# Patient Record
Sex: MALE | Race: WHITE | NOT HISPANIC OR LATINO
[De-identification: names, ages, dates, MRNs, and addresses within clinical notes are randomized per-mention and may not be internally consistent; named-entity substitution may affect disease eponyms.]

---

## 2018-10-29 PROBLEM — Z00.00 ENCOUNTER FOR PREVENTIVE HEALTH EXAMINATION: Status: ACTIVE | Noted: 2018-10-29

## 2018-12-27 ENCOUNTER — APPOINTMENT (OUTPATIENT)
Dept: UROLOGY | Facility: CLINIC | Age: 78
End: 2018-12-27

## 2019-08-08 PROCEDURE — 77263 THER RADIOLOGY TX PLNG CPLX: CPT

## 2019-08-08 PROCEDURE — 99203 OFFICE O/P NEW LOW 30 MIN: CPT | Mod: 25

## 2019-08-13 PROCEDURE — 77334 RADIATION TREATMENT AID(S): CPT | Mod: 26

## 2019-08-13 PROCEDURE — 77290 THER RAD SIMULAJ FIELD CPLX: CPT | Mod: 26

## 2019-08-14 PROCEDURE — 77334 RADIATION TREATMENT AID(S): CPT | Mod: 26

## 2019-08-14 PROCEDURE — 77321 SPECIAL TELETX PORT PLAN: CPT | Mod: 26

## 2019-08-22 ENCOUNTER — OUTPATIENT (OUTPATIENT)
Dept: OUTPATIENT SERVICES | Facility: HOSPITAL | Age: 79
LOS: 1 days | Discharge: HOME | End: 2019-08-22

## 2019-08-22 ENCOUNTER — LABORATORY RESULT (OUTPATIENT)
Age: 79
End: 2019-08-22

## 2019-08-22 PROCEDURE — 77427 RADIATION TX MANAGEMENT X5: CPT

## 2019-08-27 ENCOUNTER — OTHER (OUTPATIENT)
Age: 79
End: 2019-08-27

## 2019-08-27 VITALS
TEMPERATURE: 97.8 F | SYSTOLIC BLOOD PRESSURE: 142 MMHG | WEIGHT: 201.5 LBS | HEART RATE: 64 BPM | DIASTOLIC BLOOD PRESSURE: 73 MMHG | RESPIRATION RATE: 18 BRPM

## 2019-08-27 RX ORDER — SERTRALINE HYDROCHLORIDE 50 MG/1
50 TABLET, FILM COATED ORAL
Refills: 0 | Status: ACTIVE | COMMUNITY
Start: 2019-08-27

## 2019-08-27 RX ORDER — PRAVASTATIN SODIUM 40 MG/1
40 TABLET ORAL
Refills: 0 | Status: ACTIVE | COMMUNITY
Start: 2019-08-27

## 2019-08-27 RX ORDER — CHOLECALCIFEROL (VITAMIN D3) 50 MCG
25 MCG TABLET ORAL
Refills: 0 | Status: ACTIVE | COMMUNITY
Start: 2019-08-27

## 2019-08-27 RX ORDER — WARFARIN SODIUM 2.5 MG/1
2.5 TABLET ORAL
Refills: 0 | Status: ACTIVE | COMMUNITY
Start: 2019-08-27

## 2019-08-27 RX ORDER — AMLODIPINE BESYLATE 10 MG/1
10 TABLET ORAL
Refills: 0 | Status: ACTIVE | COMMUNITY
Start: 2019-08-27

## 2019-08-28 NOTE — DISEASE MANAGEMENT
[Clinical] : TNM Stage: c [TTNM] : . [MTNM] : . [NTNM] : . [II] : II [de-identified] : 1200cGy [de-identified] : 2040cGy [de-identified] : scalp

## 2019-08-28 NOTE — PHYSICAL EXAM
[de-identified] : ECOG 1 [de-identified] : At this point there are no changes on the scalp, the incisions well-healed, and there are no suspicious findings.

## 2019-08-29 PROCEDURE — 77427 RADIATION TX MANAGEMENT X5: CPT

## 2019-08-30 ENCOUNTER — OTHER (OUTPATIENT)
Age: 79
End: 2019-08-30

## 2019-09-03 ENCOUNTER — OTHER (OUTPATIENT)
Age: 79
End: 2019-09-03

## 2019-09-03 VITALS
TEMPERATURE: 96.5 F | HEART RATE: 70 BPM | DIASTOLIC BLOOD PRESSURE: 75 MMHG | WEIGHT: 201.6 LBS | SYSTOLIC BLOOD PRESSURE: 171 MMHG | RESPIRATION RATE: 16 BRPM

## 2019-09-03 NOTE — DISEASE MANAGEMENT
[NTNM] : . [TTNM] : . [MTNM] : . [de-identified] : 0573cGy [de-identified] : 8060cGy [de-identified] : scalp

## 2019-09-03 NOTE — HISTORY OF PRESENT ILLNESS
[FreeTextEntry1] : 9/4/19 Nursing: No skin reaction noted. Applying Eucerin after treatment. Reviewed skin care. Denies pain or discomfort.  almost at the custodial point, no issues.

## 2019-09-03 NOTE — VITALS
[Least Pain Intensity: 0/10] : 0/10 [Maximal Pain Intensity: 0/10] : 0/10 [NoTreatment Scheduled] : no treatment scheduled

## 2019-09-09 PROCEDURE — 77427 RADIATION TX MANAGEMENT X5: CPT

## 2019-09-11 ENCOUNTER — OTHER (OUTPATIENT)
Age: 79
End: 2019-09-11

## 2019-09-11 VITALS
DIASTOLIC BLOOD PRESSURE: 74 MMHG | SYSTOLIC BLOOD PRESSURE: 157 MMHG | TEMPERATURE: 97.1 F | HEART RATE: 61 BPM | BODY MASS INDEX: 28.63 KG/M2 | HEIGHT: 70 IN | RESPIRATION RATE: 16 BRPM | WEIGHT: 200 LBS

## 2019-09-12 NOTE — DISEASE MANAGEMENT
[Clinical] : TNM Stage: c [II] : II [TTNM] : . [NTNM] : . [MTNM] : . [de-identified] : 3750cGy [de-identified] : 3900c?Gy [de-identified] : Scalp

## 2019-09-12 NOTE — HISTORY OF PRESENT ILLNESS
[FreeTextEntry1] : 9/11/19 Nursing:Mild erythema scalp, dry and intact. Skin care reviewed, patient moisturizing daily with Eucerin cream. \par \par 9/4/19 Nursing: No skin reaction noted. Applying Eucerin after treatment. Reviewed skin care. Denies pain or discomfort.  almost at the alf point, no issues.

## 2019-09-12 NOTE — PHYSICAL EXAM
[de-identified] : ECOG 1 [de-identified] : There is a modest, confluent erythema in the field.

## 2019-09-13 ENCOUNTER — OUTPATIENT (OUTPATIENT)
Dept: OUTPATIENT SERVICES | Facility: HOSPITAL | Age: 79
LOS: 1 days | Discharge: HOME | End: 2019-09-13
Payer: MEDICARE

## 2019-09-13 DIAGNOSIS — C44.42 SQUAMOUS CELL CARCINOMA OF SKIN OF SCALP AND NECK: ICD-10-CM

## 2019-09-25 ENCOUNTER — EMERGENCY (EMERGENCY)
Facility: HOSPITAL | Age: 79
LOS: 0 days | Discharge: HOME | End: 2019-09-26
Attending: EMERGENCY MEDICINE | Admitting: EMERGENCY MEDICINE
Payer: MEDICARE

## 2019-09-25 VITALS
TEMPERATURE: 98 F | RESPIRATION RATE: 18 BRPM | OXYGEN SATURATION: 95 % | HEART RATE: 79 BPM | SYSTOLIC BLOOD PRESSURE: 161 MMHG | DIASTOLIC BLOOD PRESSURE: 75 MMHG

## 2019-09-25 DIAGNOSIS — L85.9 EPIDERMAL THICKENING, UNSPECIFIED: ICD-10-CM

## 2019-09-25 DIAGNOSIS — M79.18 MYALGIA, OTHER SITE: ICD-10-CM

## 2019-09-25 DIAGNOSIS — L89.319 PRESSURE ULCER OF RIGHT BUTTOCK, UNSPECIFIED STAGE: ICD-10-CM

## 2019-09-25 DIAGNOSIS — Z79.01 LONG TERM (CURRENT) USE OF ANTICOAGULANTS: ICD-10-CM

## 2019-09-25 DIAGNOSIS — L89.329 PRESSURE ULCER OF LEFT BUTTOCK, UNSPECIFIED STAGE: ICD-10-CM

## 2019-09-25 PROCEDURE — 99284 EMERGENCY DEPT VISIT MOD MDM: CPT

## 2019-09-26 VITALS
DIASTOLIC BLOOD PRESSURE: 75 MMHG | SYSTOLIC BLOOD PRESSURE: 148 MMHG | HEART RATE: 76 BPM | TEMPERATURE: 96 F | OXYGEN SATURATION: 98 % | RESPIRATION RATE: 18 BRPM

## 2019-09-26 LAB
ALBUMIN SERPL ELPH-MCNC: 4.4 G/DL — SIGNIFICANT CHANGE UP (ref 3.5–5.2)
ALP SERPL-CCNC: 102 U/L — SIGNIFICANT CHANGE UP (ref 30–115)
ALT FLD-CCNC: 12 U/L — SIGNIFICANT CHANGE UP (ref 0–41)
ANION GAP SERPL CALC-SCNC: 14 MMOL/L — SIGNIFICANT CHANGE UP (ref 7–14)
APPEARANCE UR: CLEAR — SIGNIFICANT CHANGE UP
APTT BLD: 42.7 SEC — HIGH (ref 27–39.2)
AST SERPL-CCNC: 17 U/L — SIGNIFICANT CHANGE UP (ref 0–41)
BACTERIA # UR AUTO: NEGATIVE — SIGNIFICANT CHANGE UP
BASOPHILS # BLD AUTO: 0.06 K/UL — SIGNIFICANT CHANGE UP (ref 0–0.2)
BASOPHILS NFR BLD AUTO: 0.9 % — SIGNIFICANT CHANGE UP (ref 0–1)
BILIRUB SERPL-MCNC: 0.4 MG/DL — SIGNIFICANT CHANGE UP (ref 0.2–1.2)
BILIRUB UR-MCNC: NEGATIVE — SIGNIFICANT CHANGE UP
BUN SERPL-MCNC: 27 MG/DL — HIGH (ref 10–20)
CALCIUM SERPL-MCNC: 8.7 MG/DL — SIGNIFICANT CHANGE UP (ref 8.5–10.1)
CHLORIDE SERPL-SCNC: 101 MMOL/L — SIGNIFICANT CHANGE UP (ref 98–110)
CO2 SERPL-SCNC: 20 MMOL/L — SIGNIFICANT CHANGE UP (ref 17–32)
COLOR SPEC: COLORLESS — SIGNIFICANT CHANGE UP
CREAT SERPL-MCNC: 2.8 MG/DL — HIGH (ref 0.7–1.5)
DIFF PNL FLD: SIGNIFICANT CHANGE UP
EOSINOPHIL # BLD AUTO: 0.64 K/UL — SIGNIFICANT CHANGE UP (ref 0–0.7)
EOSINOPHIL NFR BLD AUTO: 9.6 % — HIGH (ref 0–8)
EPI CELLS # UR: 0 /HPF — SIGNIFICANT CHANGE UP (ref 0–5)
GLUCOSE SERPL-MCNC: 189 MG/DL — HIGH (ref 70–99)
GLUCOSE UR QL: NEGATIVE — SIGNIFICANT CHANGE UP
HCT VFR BLD CALC: 32.7 % — LOW (ref 42–52)
HGB BLD-MCNC: 10.9 G/DL — LOW (ref 14–18)
HYALINE CASTS # UR AUTO: 1 /LPF — SIGNIFICANT CHANGE UP (ref 0–7)
IMM GRANULOCYTES NFR BLD AUTO: 0.5 % — HIGH (ref 0.1–0.3)
INR BLD: 2.78 RATIO — HIGH (ref 0.65–1.3)
KETONES UR-MCNC: NEGATIVE — SIGNIFICANT CHANGE UP
LACTATE SERPL-SCNC: 2 MMOL/L — SIGNIFICANT CHANGE UP (ref 0.5–2.2)
LEUKOCYTE ESTERASE UR-ACNC: NEGATIVE — SIGNIFICANT CHANGE UP
LYMPHOCYTES # BLD AUTO: 0.81 K/UL — LOW (ref 1.2–3.4)
LYMPHOCYTES # BLD AUTO: 12.2 % — LOW (ref 20.5–51.1)
MCHC RBC-ENTMCNC: 29.1 PG — SIGNIFICANT CHANGE UP (ref 27–31)
MCHC RBC-ENTMCNC: 33.3 G/DL — SIGNIFICANT CHANGE UP (ref 32–37)
MCV RBC AUTO: 87.4 FL — SIGNIFICANT CHANGE UP (ref 80–94)
MONOCYTES # BLD AUTO: 0.55 K/UL — SIGNIFICANT CHANGE UP (ref 0.1–0.6)
MONOCYTES NFR BLD AUTO: 8.3 % — SIGNIFICANT CHANGE UP (ref 1.7–9.3)
NEUTROPHILS # BLD AUTO: 4.57 K/UL — SIGNIFICANT CHANGE UP (ref 1.4–6.5)
NEUTROPHILS NFR BLD AUTO: 68.5 % — SIGNIFICANT CHANGE UP (ref 42.2–75.2)
NITRITE UR-MCNC: NEGATIVE — SIGNIFICANT CHANGE UP
NRBC # BLD: 0 /100 WBCS — SIGNIFICANT CHANGE UP (ref 0–0)
PH UR: 6 — SIGNIFICANT CHANGE UP (ref 5–8)
PLATELET # BLD AUTO: 236 K/UL — SIGNIFICANT CHANGE UP (ref 130–400)
POTASSIUM SERPL-MCNC: 4.1 MMOL/L — SIGNIFICANT CHANGE UP (ref 3.5–5)
POTASSIUM SERPL-SCNC: 4.1 MMOL/L — SIGNIFICANT CHANGE UP (ref 3.5–5)
PROT SERPL-MCNC: 7.7 G/DL — SIGNIFICANT CHANGE UP (ref 6–8)
PROT UR-MCNC: ABNORMAL
PROTHROM AB SERPL-ACNC: 31.7 SEC — HIGH (ref 9.95–12.87)
RBC # BLD: 3.74 M/UL — LOW (ref 4.7–6.1)
RBC # FLD: 13.2 % — SIGNIFICANT CHANGE UP (ref 11.5–14.5)
RBC CASTS # UR COMP ASSIST: 1 /HPF — SIGNIFICANT CHANGE UP (ref 0–4)
SODIUM SERPL-SCNC: 135 MMOL/L — SIGNIFICANT CHANGE UP (ref 135–146)
SP GR SPEC: 1.01 — LOW (ref 1.01–1.02)
UROBILINOGEN FLD QL: SIGNIFICANT CHANGE UP
WBC # BLD: 6.66 K/UL — SIGNIFICANT CHANGE UP (ref 4.8–10.8)
WBC # FLD AUTO: 6.66 K/UL — SIGNIFICANT CHANGE UP (ref 4.8–10.8)
WBC UR QL: 0 /HPF — SIGNIFICANT CHANGE UP (ref 0–5)

## 2019-09-26 NOTE — ED PROVIDER NOTE - OBJECTIVE STATEMENT
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 Pt states that he sleeps on a recliner and also sometimes has bowel incontinence in his diaper. has intermittent dependent ulcers that normally heals but this incident is not healing and associated with bleeding. Has minimal pain, no fever, no abd pain. No trauma. No aggravating or alleviating factors.

## 2019-09-26 NOTE — ED PROVIDER NOTE - PHYSICAL EXAMINATION
CONSTITUTIONAL: Well-developed; well-nourished; in no acute distress, nontoxic appearing  SKIN: + skin hyperkeratosis on b/l buttock with associated superficial subcutaneous hematomas. + friable area on the left buttock with blood oozing from that site.  HEAD: Normocephalic; atraumatic.  EYES: PERRL, 3 mm bilateral, no nystagmus, EOM intact; conjunctiva and sclera clear.  ENT: MMM, no nasal congestion  NECK: Supple; non tender.+ full passive ROM in all directions. No JVD  CARD: S1, S2 normal, no murmur  RESP: No wheezes, rales or rhonchi. Good air movement bilaterally  ABD: soft; non-distended; non-tender. No Rebound, No guarding  EXT: Normal ROM. No cyanosis or edema. Dp Pulses intact.   NEURO: awake, alert, following commands, oriented, grossly unremarkable. No Focal deficits. GCS 15. Gait antalgic due to arthritis, chronic.  PSYCH: Cooperative, appropriate.

## 2019-09-26 NOTE — ED PROVIDER NOTE - NS ED ROS FT
Review of Systems    Constitutional: (-) fever  Heent: no complaints  Cardiovascular: (-) chest pain, (-) syncope  Respiratory: (-) cough, (-) shortness of breath  Gastrointestinal: (-) vomiting, (-) abdominal pain, hx of colectomy and chronic diarrhea.  Musculoskeletal: (-) neck pain, (-) back pain, (-) joint pain  Integumentary: As per HPI  Neurological: (-) headache, (-) altered mental status    Except as documented in the HPI, all other systems are negative.

## 2019-09-26 NOTE — ED ADULT NURSE NOTE - NSIMPLEMENTINTERV_GEN_ALL_ED
Implemented All Universal Safety Interventions:  [place] to call system. Call bell, personal items and telephone within reach. Instruct patient to call for assistance. Room bathroom lighting operational. Non-slip footwear when patient is off stretcher. Physically safe environment: no spills, clutter or unnecessary equipment. Stretcher in lowest position, wheels locked, appropriate side rails in place.

## 2019-09-26 NOTE — ED PROVIDER NOTE - CLINICAL SUMMARY MEDICAL DECISION MAKING FREE TEXT BOX
Pt with chronic buttock wound, required labs and burn consult. No acute intervention, pt to f/up outpatient. Discussed wound dressing with patient and granddaughter. Will d/c.    ED evaluation and management discussed with the patient and family (if available) in detail.  Close PMD follow up encouraged.  Strict ED return instructions discussed in detail and patient given the opportunity to ask any questions about their discharge diagnosis and instructions. Patient verbalized understanding.

## 2019-09-26 NOTE — ED ADULT NURSE NOTE - OBJECTIVE STATEMENT
pt presents to ed with c/o bleeding from sacral wound. pt presents to ed with c/o bleeding from sacral wound. pt states he has had similar wounds in the past from sleeping on his recliner chair but non have bled like this before. pt pmd directed him to come to ed for treatment. pt reports that he has chronic diarrhea and goes to the bathroom multiple times a day. pt is able to ambulate and use the bathroom independently but wears diapers for emergencies. pt denies feeling lightheaded or dizzy, SOB, chest pain, n/v.  pt presents with bilateral buttocks with macerated tissue along with scarring. Two hemorrhagic blisters, one on each buttock. Active Oozing blood near left gluteal crease.

## 2019-09-26 NOTE — CONSULT NOTE ADULT - ASSESSMENT
79y male with bilateral buttock wounds - likely 2ndary to prolonged sitting, pressure, elevated INR and chronic diarrhea macerating the skin        Recommendations  C/w with transexamic acid until bleeding stops in ED  LWC BID xeroform/gauze , change if soiled   F/u with colorectal surgeon for possible biopsy of wounds to r/o malignant cause of wounds  Burn f/u within 1 to 2 weeks if no improvement  F/u with primary care provider for coumadin therapy and pt/INR management   discussed with attending and ED provider   No surgery for debridement indicated at this time

## 2019-09-26 NOTE — CONSULT NOTE ADULT - SUBJECTIVE AND OBJECTIVE BOX
79y male with pmh of dvt on coumadin, and colorectal cancer s/p total colectomy ~20 years ago presents to ED with complaint of bleeding from buttock wounds for which Burn Service consulted for. Patient has had similar wounds in the past which have healed without issue but never experienced bleeding like this before. Patient's primary care doctor is the one who told him to come to ED to be evaluated Patient suffers from chronic diarrhea due to his colon cancer history and has diarrhea multiple times a day for the past 20 years. Patient is ambulatory and uses the bathroom independently but wears a diaper in case of emergencies. Patient reports he sleeps on his recliner chair every night and also spends ~60% of his awake time on the recliner as well. Patient denies feeling lightheaded or dizzy, SOB or chest pain.    Physical exam    Bilateral buttocks with macerated tissue along with scarring. Two hemorrhagic blisters, one on each buttock. Active Oozing blood near left gluteal crease.     Transexamic acid soaked gauze applied to left buttock at site of bleeding. Xeroform applied to right buttock.                               10.9   6.66  )-----------( 236      ( 26 Sep 2019 00:45 )             32.7   PT/INR - ( 26 Sep 2019 01:00 )   PT: 31.70 sec;   INR: 2.78 ratio         PTT - ( 26 Sep 2019 01:00 )  PTT:42.7 sec

## 2019-09-27 LAB
CULTURE RESULTS: SIGNIFICANT CHANGE UP
SPECIMEN SOURCE: SIGNIFICANT CHANGE UP

## 2019-10-03 ENCOUNTER — OUTPATIENT (OUTPATIENT)
Dept: OUTPATIENT SERVICES | Facility: HOSPITAL | Age: 79
LOS: 1 days | Discharge: HOME | End: 2019-10-03

## 2019-10-03 ENCOUNTER — APPOINTMENT (OUTPATIENT)
Dept: BURN CARE | Facility: CLINIC | Age: 79
End: 2019-10-03
Payer: COMMERCIAL

## 2019-10-03 ENCOUNTER — APPOINTMENT (OUTPATIENT)
Dept: RADIATION ONCOLOGY | Facility: CLINIC | Age: 79
End: 2019-10-03
Payer: MEDICARE

## 2019-10-03 VITALS
HEART RATE: 73 BPM | TEMPERATURE: 96.2 F | RESPIRATION RATE: 16 BRPM | DIASTOLIC BLOOD PRESSURE: 60 MMHG | BODY MASS INDEX: 28.84 KG/M2 | WEIGHT: 201 LBS | SYSTOLIC BLOOD PRESSURE: 133 MMHG

## 2019-10-03 DIAGNOSIS — Z85.828 PERSONAL HISTORY OF OTHER MALIGNANT NEOPLASM OF SKIN: ICD-10-CM

## 2019-10-03 DIAGNOSIS — C44.42 SQUAMOUS CELL CARCINOMA OF SKIN OF SCALP AND NECK: ICD-10-CM

## 2019-10-03 DIAGNOSIS — Z85.038 PERSONAL HISTORY OF OTHER MALIGNANT NEOPLASM OF LARGE INTESTINE: ICD-10-CM

## 2019-10-03 DIAGNOSIS — Z86.79 PERSONAL HISTORY OF OTHER DISEASES OF THE CIRCULATORY SYSTEM: ICD-10-CM

## 2019-10-03 PROCEDURE — 99203 OFFICE O/P NEW LOW 30 MIN: CPT

## 2019-10-03 PROCEDURE — 99024 POSTOP FOLLOW-UP VISIT: CPT

## 2019-10-03 NOTE — HISTORY OF PRESENT ILLNESS
[Did you have an operation on your burn/wound injury?] : Did you have an operation on your burn/wound injury? No [Did this injury occur on the job?] : Did this injury occur on the job? No [de-identified] : 6 months ago [de-identified] : home [de-identified] : Pt states that he sleeps in his chair for long periods of time causing a pressure ulcer on his buttock. He is currently using dry gauze.

## 2019-10-03 NOTE — DISEASE MANAGEMENT
[Clinical] : TNM Stage: c [II] : II [NTNM] : . [TTNM] : . [de-identified] : 5150cGy [MTNM] : . [de-identified] : 0200cGy [de-identified] : Scalp  Interval since therapy:  One month

## 2019-10-03 NOTE — REASON FOR VISIT
[Initial] : initial visit [Were you seen in the Emergency Room?] : seen in the emergency room [Were you admitted to the burn center at [facility]?] : not admitted to the burn center at [facility]

## 2019-10-03 NOTE — PHYSICAL EXAM
[Normal] : well developed, well nourished, in no acute distress [de-identified] : ECOG 1 (cane) [de-identified] : The skin of the scalp is now normal without erythema or desquamation.  No suspicious findings.

## 2019-10-03 NOTE — HISTORY OF PRESENT ILLNESS
[FreeTextEntry1] : 10/3/19 Pt returns for follow up after completion of RT to scalp. Denies any issues since completion of RT. No skin reaction or erythema noted. Small dry patch noted. Applying Eucerin twice daily. No complaints at this time. Followed up with Dr. Casey today for bedsores.

## 2019-10-17 ENCOUNTER — APPOINTMENT (OUTPATIENT)
Dept: BURN CARE | Facility: CLINIC | Age: 79
End: 2019-10-17
Payer: COMMERCIAL

## 2019-10-17 ENCOUNTER — OUTPATIENT (OUTPATIENT)
Dept: OUTPATIENT SERVICES | Facility: HOSPITAL | Age: 79
LOS: 1 days | Discharge: HOME | End: 2019-10-17

## 2019-10-17 PROCEDURE — 16020 DRESS/DEBRID P-THICK BURN S: CPT

## 2019-10-17 PROCEDURE — 99213 OFFICE O/P EST LOW 20 MIN: CPT | Mod: 25

## 2019-10-22 NOTE — ASSESSMENT
[Wound Care] : wound care [FreeTextEntry1] : bilateral buttocks healing--> debrided --> local wound care

## 2019-10-22 NOTE — PHYSICAL EXAM
[Healing] : healing [Infected?] : Infected: No [Size%: ______] : Size: [unfilled]% [Abnormal] : abnormal [3] : 3 out of 10 [Large] : medium [] : no [de-identified] : bilateral buttocks healing--> debrided --> local wound care [TWNoteComboBox1] : SALEEM

## 2019-10-22 NOTE — HISTORY OF PRESENT ILLNESS
[Did you have an operation on your burn/wound injury?] : Did you have an operation on your burn/wound injury? No [Did this injury occur on the job?] : Did this injury occur on the job? No [de-identified] : 6 months ago [de-identified] : home [de-identified] : bilateral buttock pressure sores [de-identified] : healing

## 2019-10-22 NOTE — REASON FOR VISIT
[Were you seen in the Emergency Room?] : seen in the emergency room [Revisit] : revisit [Were you admitted to the burn center at [facility]?] : not admitted to the burn center at [facility] [FreeTextEntry2] : evaluation buttock pressure sore

## 2019-11-14 ENCOUNTER — OUTPATIENT (OUTPATIENT)
Dept: OUTPATIENT SERVICES | Facility: HOSPITAL | Age: 79
LOS: 1 days | Discharge: HOME | End: 2019-11-14

## 2019-11-14 ENCOUNTER — APPOINTMENT (OUTPATIENT)
Dept: BURN CARE | Facility: CLINIC | Age: 79
End: 2019-11-14
Payer: COMMERCIAL

## 2019-11-14 DIAGNOSIS — L89.309 PRESSURE ULCER OF UNSPECIFIED BUTTOCK, UNSPECIFIED STAGE: ICD-10-CM

## 2019-11-14 PROCEDURE — 99213 OFFICE O/P EST LOW 20 MIN: CPT | Mod: 25

## 2019-11-14 PROCEDURE — 16025 DRESS/DEBRID P-THICK BURN M: CPT

## 2019-11-14 NOTE — REASON FOR VISIT
[Revisit] : revisit [Were you seen in the Emergency Room?] : seen in the emergency room [FreeTextEntry2] : evaluation buttock pressure sore [Were you admitted to the burn center at [facility]?] : not admitted to the burn center at [facility]

## 2019-11-14 NOTE — PHYSICAL EXAM
[Healing] : healing [Size%: ______] : Size: [unfilled]% [3] : 3 out of 10 [Abnormal] : abnormal [Large] : medium [Infected?] : Infected: No [de-identified] : bilateral buttocks healing--> debrided --> local wound care [] : no [TWNoteComboBox1] : xeroform [TWNoteComboBox2] : Bacitracin

## 2019-11-14 NOTE — PHYSICAL EXAM
[Healing] : healing [Size%: ______] : Size: [unfilled]% [3] : 3 out of 10 [Abnormal] : abnormal [Large] : medium [Infected?] : Infected: No [de-identified] : bilateral buttocks healing--> debrided --> local wound care [] : no [TWNoteComboBox1] : xeroform [TWNoteComboBox2] : Bacitracin

## 2019-11-14 NOTE — REASON FOR VISIT
[Revisit] : revisit [Were you seen in the Emergency Room?] : seen in the emergency room [Were you admitted to the burn center at [facility]?] : not admitted to the burn center at [facility] [FreeTextEntry2] : evaluation buttock pressure sore

## 2019-11-14 NOTE — HISTORY OF PRESENT ILLNESS
[Did you have an operation on your burn/wound injury?] : Did you have an operation on your burn/wound injury? No [Did this injury occur on the job?] : Did this injury occur on the job? No [de-identified] : home [de-identified] : 6 months ago [de-identified] : healing [de-identified] : bilateral buttock pressure sores

## 2019-11-14 NOTE — HISTORY OF PRESENT ILLNESS
[Did you have an operation on your burn/wound injury?] : Did you have an operation on your burn/wound injury? No [Did this injury occur on the job?] : Did this injury occur on the job? No [de-identified] : 6 months ago [de-identified] : home [de-identified] : bilateral buttock pressure sores [de-identified] : healing
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
## 2019-11-19 DIAGNOSIS — L89.309 PRESSURE ULCER OF UNSPECIFIED BUTTOCK, UNSPECIFIED STAGE: ICD-10-CM

## 2019-12-05 ENCOUNTER — OUTPATIENT (OUTPATIENT)
Dept: OUTPATIENT SERVICES | Facility: HOSPITAL | Age: 79
LOS: 1 days | Discharge: HOME | End: 2019-12-05

## 2019-12-05 ENCOUNTER — APPOINTMENT (OUTPATIENT)
Dept: BURN CARE | Facility: CLINIC | Age: 79
End: 2019-12-05
Payer: COMMERCIAL

## 2019-12-05 PROCEDURE — 16025 DRESS/DEBRID P-THICK BURN M: CPT

## 2019-12-05 PROCEDURE — 99213 OFFICE O/P EST LOW 20 MIN: CPT | Mod: 25

## 2019-12-16 DIAGNOSIS — L89.309 PRESSURE ULCER OF UNSPECIFIED BUTTOCK, UNSPECIFIED STAGE: ICD-10-CM
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
## 2020-01-16 ENCOUNTER — APPOINTMENT (OUTPATIENT)
Dept: BURN CARE | Facility: CLINIC | Age: 80
End: 2020-01-16

## 2020-05-22 ENCOUNTER — EMERGENCY (EMERGENCY)
Facility: HOSPITAL | Age: 80
LOS: 0 days | Discharge: HOME | End: 2020-05-22
Attending: STUDENT IN AN ORGANIZED HEALTH CARE EDUCATION/TRAINING PROGRAM | Admitting: STUDENT IN AN ORGANIZED HEALTH CARE EDUCATION/TRAINING PROGRAM
Payer: MEDICARE

## 2020-05-22 VITALS
OXYGEN SATURATION: 97 % | DIASTOLIC BLOOD PRESSURE: 61 MMHG | HEART RATE: 82 BPM | TEMPERATURE: 100 F | RESPIRATION RATE: 18 BRPM | SYSTOLIC BLOOD PRESSURE: 114 MMHG

## 2020-05-22 VITALS
RESPIRATION RATE: 16 BRPM | OXYGEN SATURATION: 98 % | HEART RATE: 85 BPM | SYSTOLIC BLOOD PRESSURE: 120 MMHG | TEMPERATURE: 98 F | DIASTOLIC BLOOD PRESSURE: 65 MMHG

## 2020-05-22 DIAGNOSIS — Z00.00 ENCOUNTER FOR GENERAL ADULT MEDICAL EXAMINATION WITHOUT ABNORMAL FINDINGS: ICD-10-CM

## 2020-05-22 DIAGNOSIS — N17.9 ACUTE KIDNEY FAILURE, UNSPECIFIED: ICD-10-CM

## 2020-05-22 DIAGNOSIS — L98.419 NON-PRESSURE CHRONIC ULCER OF BUTTOCK WITH UNSPECIFIED SEVERITY: ICD-10-CM

## 2020-05-22 LAB
ALBUMIN SERPL ELPH-MCNC: 3.3 G/DL — LOW (ref 3.5–5.2)
ALP SERPL-CCNC: 69 U/L — SIGNIFICANT CHANGE UP (ref 30–115)
ALT FLD-CCNC: 10 U/L — SIGNIFICANT CHANGE UP (ref 0–41)
ANION GAP SERPL CALC-SCNC: 16 MMOL/L — HIGH (ref 7–14)
APPEARANCE UR: CLEAR — SIGNIFICANT CHANGE UP
APTT BLD: 35.9 SEC — SIGNIFICANT CHANGE UP (ref 27–39.2)
AST SERPL-CCNC: 13 U/L — SIGNIFICANT CHANGE UP (ref 0–41)
BACTERIA # UR AUTO: NEGATIVE — SIGNIFICANT CHANGE UP
BASOPHILS # BLD AUTO: 0.02 K/UL — SIGNIFICANT CHANGE UP (ref 0–0.2)
BASOPHILS NFR BLD AUTO: 0.3 % — SIGNIFICANT CHANGE UP (ref 0–1)
BILIRUB SERPL-MCNC: 0.4 MG/DL — SIGNIFICANT CHANGE UP (ref 0.2–1.2)
BILIRUB UR-MCNC: NEGATIVE — SIGNIFICANT CHANGE UP
BLD GP AB SCN SERPL QL: SIGNIFICANT CHANGE UP
BUN SERPL-MCNC: 54 MG/DL — HIGH (ref 10–20)
CALCIUM SERPL-MCNC: 8.7 MG/DL — SIGNIFICANT CHANGE UP (ref 8.5–10.1)
CHLORIDE SERPL-SCNC: 92 MMOL/L — LOW (ref 98–110)
CO2 SERPL-SCNC: 21 MMOL/L — SIGNIFICANT CHANGE UP (ref 17–32)
COLOR SPEC: YELLOW — SIGNIFICANT CHANGE UP
CREAT SERPL-MCNC: 3.6 MG/DL — HIGH (ref 0.7–1.5)
DIFF PNL FLD: ABNORMAL
EOSINOPHIL # BLD AUTO: 0.01 K/UL — SIGNIFICANT CHANGE UP (ref 0–0.7)
EOSINOPHIL NFR BLD AUTO: 0.1 % — SIGNIFICANT CHANGE UP (ref 0–8)
EPI CELLS # UR: 2 /HPF — SIGNIFICANT CHANGE UP (ref 0–5)
GLUCOSE SERPL-MCNC: 134 MG/DL — HIGH (ref 70–99)
GLUCOSE UR QL: NEGATIVE — SIGNIFICANT CHANGE UP
HCT VFR BLD CALC: 31.8 % — LOW (ref 42–52)
HGB BLD-MCNC: 10.6 G/DL — LOW (ref 14–18)
HYALINE CASTS # UR AUTO: 7 /LPF — SIGNIFICANT CHANGE UP (ref 0–7)
IMM GRANULOCYTES NFR BLD AUTO: 0.4 % — HIGH (ref 0.1–0.3)
INR BLD: 3.77 RATIO — HIGH (ref 0.65–1.3)
KETONES UR-MCNC: NEGATIVE — SIGNIFICANT CHANGE UP
LEUKOCYTE ESTERASE UR-ACNC: NEGATIVE — SIGNIFICANT CHANGE UP
LIDOCAIN IGE QN: 51 U/L — SIGNIFICANT CHANGE UP (ref 7–60)
LYMPHOCYTES # BLD AUTO: 0.66 K/UL — LOW (ref 1.2–3.4)
LYMPHOCYTES # BLD AUTO: 8.7 % — LOW (ref 20.5–51.1)
MAGNESIUM SERPL-MCNC: 2 MG/DL — SIGNIFICANT CHANGE UP (ref 1.8–2.4)
MCHC RBC-ENTMCNC: 28 PG — SIGNIFICANT CHANGE UP (ref 27–31)
MCHC RBC-ENTMCNC: 33.3 G/DL — SIGNIFICANT CHANGE UP (ref 32–37)
MCV RBC AUTO: 84.1 FL — SIGNIFICANT CHANGE UP (ref 80–94)
MONOCYTES # BLD AUTO: 1.29 K/UL — HIGH (ref 0.1–0.6)
MONOCYTES NFR BLD AUTO: 17 % — HIGH (ref 1.7–9.3)
NEUTROPHILS # BLD AUTO: 5.6 K/UL — SIGNIFICANT CHANGE UP (ref 1.4–6.5)
NEUTROPHILS NFR BLD AUTO: 73.5 % — SIGNIFICANT CHANGE UP (ref 42.2–75.2)
NITRITE UR-MCNC: NEGATIVE — SIGNIFICANT CHANGE UP
NRBC # BLD: 0 /100 WBCS — SIGNIFICANT CHANGE UP (ref 0–0)
PH UR: 6 — SIGNIFICANT CHANGE UP (ref 5–8)
PHOSPHATE SERPL-MCNC: 3.8 MG/DL — SIGNIFICANT CHANGE UP (ref 2.1–4.9)
PLATELET # BLD AUTO: 301 K/UL — SIGNIFICANT CHANGE UP (ref 130–400)
POTASSIUM SERPL-MCNC: 5 MMOL/L — SIGNIFICANT CHANGE UP (ref 3.5–5)
POTASSIUM SERPL-SCNC: 5 MMOL/L — SIGNIFICANT CHANGE UP (ref 3.5–5)
PROT SERPL-MCNC: 6.8 G/DL — SIGNIFICANT CHANGE UP (ref 6–8)
PROT UR-MCNC: ABNORMAL
PROTHROM AB SERPL-ACNC: >40 SEC — HIGH (ref 9.95–12.87)
RBC # BLD: 3.78 M/UL — LOW (ref 4.7–6.1)
RBC # FLD: 14.1 % — SIGNIFICANT CHANGE UP (ref 11.5–14.5)
RBC CASTS # UR COMP ASSIST: 1 /HPF — SIGNIFICANT CHANGE UP (ref 0–4)
SODIUM SERPL-SCNC: 129 MMOL/L — LOW (ref 135–146)
SP GR SPEC: 1.02 — SIGNIFICANT CHANGE UP (ref 1.01–1.02)
UROBILINOGEN FLD QL: SIGNIFICANT CHANGE UP
WBC # BLD: 7.61 K/UL — SIGNIFICANT CHANGE UP (ref 4.8–10.8)
WBC # FLD AUTO: 7.61 K/UL — SIGNIFICANT CHANGE UP (ref 4.8–10.8)
WBC UR QL: 3 /HPF — SIGNIFICANT CHANGE UP (ref 0–5)

## 2020-05-22 PROCEDURE — 74176 CT ABD & PELVIS W/O CONTRAST: CPT | Mod: 26

## 2020-05-22 PROCEDURE — 99285 EMERGENCY DEPT VISIT HI MDM: CPT

## 2020-05-22 PROCEDURE — 71045 X-RAY EXAM CHEST 1 VIEW: CPT | Mod: 26

## 2020-05-22 RX ORDER — PIPERACILLIN AND TAZOBACTAM 4; .5 G/20ML; G/20ML
3.38 INJECTION, POWDER, LYOPHILIZED, FOR SOLUTION INTRAVENOUS ONCE
Refills: 0 | Status: DISCONTINUED | OUTPATIENT
Start: 2020-05-22 | End: 2020-05-22

## 2020-05-22 RX ORDER — CIPROFLOXACIN LACTATE 400MG/40ML
1 VIAL (ML) INTRAVENOUS
Qty: 5 | Refills: 0
Start: 2020-05-22 | End: 2020-05-31

## 2020-05-22 RX ORDER — SODIUM CHLORIDE 9 MG/ML
250 INJECTION INTRAMUSCULAR; INTRAVENOUS; SUBCUTANEOUS ONCE
Refills: 0 | Status: COMPLETED | OUTPATIENT
Start: 2020-05-22 | End: 2020-05-22

## 2020-05-22 RX ADMIN — SODIUM CHLORIDE 500 MILLILITER(S): 9 INJECTION INTRAMUSCULAR; INTRAVENOUS; SUBCUTANEOUS at 18:54

## 2020-05-22 NOTE — ED ADULT NURSE REASSESSMENT NOTE - NS ED NURSE REASSESS COMMENT FT1
Burn team at bedside, dressing applied to the sacrum wound, pt tolerated well. pt aware of the plan of care and has no questions or concerns at this time. Safety maintained, will continue to monitor

## 2020-05-22 NOTE — ED PROVIDER NOTE - NS ED ROS FT
Constitutional: +fever today, no rigors  Eyes: no eye redness, acute visual change  ENMT: no ear pain, no throat pain  Card: no chest pain, no palpitations  Pulm: no cough, no shortness of breath  GI: pos abdominal pain, no nausea or vomiting  : no dysuria or hematuria  MSK: no limitation in range of motion, no neck pain  Skin:+buttock ulcer, no lacerations  Neuro: no numbness, no weakness  Heme/Onc: no easy bruising, no bleeding tendency   Allergic: no hives, no throat swelling

## 2020-05-22 NOTE — ED PROVIDER NOTE - PROGRESS NOTE DETAILS
d/w Dr. Jay Scott- pt w/ known ckd, Cr slightly elevated from baseline. Dr. Scott rec 250cc NS, he can arrange close Nephro f/u. Per Dr. Scott, pt decompensates when hospitalized. Pt has strong outpt services and access to care. Will provide wound care recs to pt and Dr. Scott. Pt has VNS. CT pending. will discuss CT results w/ Dr. Scott as well as dispo abx Serial abdominal exam benign. pt endorsing having normal bowel movement in ED. Pt denies active abdominal pain. Pt given juice and is tolerating PO well. No nausea/vomiting or pain. CT findings d/w Dr. Thompson and Maria Dolores Scott. David Scott are leaning to discharge with close follow up given expected adverse effects associated w/ admission (deconditioning and delirium). David Scott will arrange for close renal follow up. Discussed all findings with patient and daughter Tata - haylie, supratherapeutic INR, possible partial SBO. Given pt's strong outpt follow up and home resources, patient and family comfortable with discharge with strict return precautions. Specially discussed decreased PO intake, c/f dehydration, nausea/vomiting, constipation, abdominal pain, decreased urine output.

## 2020-05-22 NOTE — ED ADULT NURSE NOTE - INTERVENTIONS DEFINITIONS
Reinforce activity limits and safety measures with patient and family/Monitor gait and stability/Instruct patient to call for assistance

## 2020-05-22 NOTE — ED PROVIDER NOTE - CLINICAL SUMMARY MEDICAL DECISION MAKING FREE TEXT BOX
pt presents for c/f nonhealing buttock ulcer. seen by burn and cleared for outpt management. pt has vns established for wound care. incidental findings d/w David Scott, pt and family with plan to address all as outpt and strict return precautions. risk associated with hospitalization outweigh benefit to David Scott and family.

## 2020-05-22 NOTE — ED ADULT NURSE REASSESSMENT NOTE - NS ED NURSE REASSESS COMMENT FT1
pt refused to wait for antibiotics first dose int he ED. His daughter in the waiting room. pt states " I will pick it up from pharmacy on my home". pt was taken to the waiting room by wheelchair. Daughter states" her daughter is bringing the car". Daughter states it is okay to leave him in the wheelchair until family member arrive with the car

## 2020-05-22 NOTE — CONSULT NOTE ADULT - ASSESSMENT
78 y/o Male with pmh of CKD,HTN, HLD, and DVT on Coumadin presented to the ED with complaint of sacral ulcer to the B/L buttock.  - no recommendations for surgery at this time   - local wound care: santyl, Dakins moist Kerlix/gauze cover with ABD pad   - follow up at outpatient burn clinic Tuesday 5/26/20

## 2020-05-22 NOTE — CONSULT NOTE ADULT - SUBJECTIVE AND OBJECTIVE BOX
80 y/o Male with pmh of CKD,HTN, HLD, and DVT on Coumadin presented to the ED with complaint of pressure ulcer to the B/L buttock. Burn team consulted for evaluation of buttock ulcer.  pt has had home wound care for the past 4 months and bed sore is not improving. Pt says he is ambulatory but sleeps in his chair. pt states he feels generally weak but is functioning at baseline. Pt had a temp of 100.3 in the ED but denies feeling fevers, chills, HA,  numbness, SOB, CP, focal weakness, and urinary incontinence.        PHYSICAL EXAM:  Gen: patient A&Ox3, no signs of acute distress   SKIN: No rashes or lesions  Wound:   Left buttock wound: ~[id number] pressure ulcer, pink and moist with some areas of white necrotic tissue around wound borders with surrounding erythema. No mucopurulent drainage or foul odor noted.   Right buttock wound: ~[id number] pressure ulcer, pink and moist with some areas of white necrotic tissue around wound borders with surrounding erythema. No mucopurulent drainage, edema  or foul odor noted.

## 2020-05-22 NOTE — ED PROVIDER NOTE - CARE PLAN
Principal Discharge DX:	Skin ulcer of buttock, unspecified ulcer stage  Secondary Diagnosis:	ALEX (acute kidney injury)

## 2020-05-22 NOTE — ED PROVIDER NOTE - PHYSICAL EXAMINATION
PHYSICAL EXAM:    Constitutional: awake, alert, NAD  Eyes: EOMI, no conj injection  HENT: NC AT  Back: no c/t/l spine ttp  Respiratory: no respiratory distress, breath sounds equal b/l, no wheezing, rhonchi or stridor.   Cardiovascular: RRR nml S1S2  Gastrointestinal: soft, no masses, nontender, nondistended. +mild LLQ discomfort on palpation. No guarding or rebound.   Left buttock wound: ~[id number] pressure ulcer, pink and moist. some areas of white necrotic tissue around wound borders with surrounding erythema. No purulent discharge  Right buttock wound: ~[id number] pressure ulcer, pink and moist with some areas of white necrotic tissue around wound borders with surrounding erythema. No purulent discharge  Extremities: no peripheral edema  Neurological: AAOx3, CN II-XII grossly intact, no focal numbness or weakness  Skin: no rash  Musculoskeletal: no gross deformity

## 2020-05-22 NOTE — ED PROVIDER NOTE - NSFOLLOWUPINSTRUCTIONS_ED_ALL_ED_FT
Take levaquin every 48 hours (5/24, 5/26, 5/28, 5/30). Follow up with Dr. Scott on Tuesday. Call his office to discuss when to see your nephrologist and if you should go to the Burn Clinic Tuesday (5/26) or another day.     Your wound care instructions are:   - local wound care: santyl, Dakins moist Kerlix/gauze cover with ABD pad   - follow up at outpatient burn clinic Tuesday 5/26/20     Return for worsening abdominal pain, nausea/vomiting or pain with eating, inability to have bowel movements, or feeling very bloated. Return for concerns of dehydration.  Return for severe weakness or fall.

## 2020-05-22 NOTE — ED PROVIDER NOTE - PATIENT PORTAL LINK FT
You can access the FollowMyHealth Patient Portal offered by [facility] by registering at the following website: [email or web address]. By joining [employer]’s FollowMyHealth portal, you will also be able to view your health information using other applications (apps) compatible with our system.

## 2020-05-22 NOTE — ED PROVIDER NOTE - OBJECTIVE STATEMENT
78 yo m hx htn, hld, ckd, decub ulcer here for evaluation of decub ulcer. pt has had home wound care for the past 4 months and bed sore is not improving. visiting nurse discussed burn eval w/ pcp Dr. Scott and pt sent in for burn eval. pt febrile today. pt states he feels generally weak but is functioning at baseline. no numbness, focal weakness, urinary incontinence. pt states he has mild intermittent abd pain which has been worked up by Dr. Scott

## 2020-05-22 NOTE — ED PROVIDER NOTE - NSFOLLOWUPCLINICS_GEN_ALL_ED_FT
[facility]  Burn  [address], [address]  [place] [address]  Phone: [phone]  Fax:   Follow Up Time:

## 2020-05-23 LAB
CULTURE RESULTS: NO GROWTH — SIGNIFICANT CHANGE UP
SPECIMEN SOURCE: SIGNIFICANT CHANGE UP

## 2020-05-24 LAB
-  AMPICILLIN/SULBACTAM: SIGNIFICANT CHANGE UP
-  CEFAZOLIN: SIGNIFICANT CHANGE UP
-  CLINDAMYCIN: SIGNIFICANT CHANGE UP
-  ERYTHROMYCIN: SIGNIFICANT CHANGE UP
-  GENTAMICIN: SIGNIFICANT CHANGE UP
-  OXACILLIN: SIGNIFICANT CHANGE UP
-  PENICILLIN: SIGNIFICANT CHANGE UP
-  RIFAMPIN: SIGNIFICANT CHANGE UP
-  TETRACYCLINE: SIGNIFICANT CHANGE UP
-  TRIMETHOPRIM/SULFAMETHOXAZOLE: SIGNIFICANT CHANGE UP
-  VANCOMYCIN: SIGNIFICANT CHANGE UP
CULTURE RESULTS: SIGNIFICANT CHANGE UP
METHOD TYPE: SIGNIFICANT CHANGE UP
ORGANISM # SPEC MICROSCOPIC CNT: SIGNIFICANT CHANGE UP
ORGANISM # SPEC MICROSCOPIC CNT: SIGNIFICANT CHANGE UP
SPECIMEN SOURCE: SIGNIFICANT CHANGE UP

## 2020-05-26 ENCOUNTER — APPOINTMENT (OUTPATIENT)
Dept: BURN CARE | Facility: CLINIC | Age: 80
End: 2020-05-26

## 2020-05-26 ENCOUNTER — INPATIENT (INPATIENT)
Facility: HOSPITAL | Age: 80
LOS: 2 days | Discharge: HOME HEALTH | End: 2020-05-29
Attending: FAMILY MEDICINE | Admitting: FAMILY MEDICINE
Payer: MEDICARE

## 2020-05-26 VITALS
TEMPERATURE: 98 F | OXYGEN SATURATION: 97 % | SYSTOLIC BLOOD PRESSURE: 94 MMHG | DIASTOLIC BLOOD PRESSURE: 53 MMHG | RESPIRATION RATE: 19 BRPM | HEART RATE: 74 BPM

## 2020-05-26 DIAGNOSIS — Z79.899 OTHER LONG TERM (CURRENT) DRUG THERAPY: ICD-10-CM

## 2020-05-26 DIAGNOSIS — E78.00 PURE HYPERCHOLESTEROLEMIA, UNSPECIFIED: ICD-10-CM

## 2020-05-26 DIAGNOSIS — L89.154 PRESSURE ULCER OF SACRAL REGION, STAGE 4: ICD-10-CM

## 2020-05-26 PROBLEM — I10 ESSENTIAL (PRIMARY) HYPERTENSION: Chronic | Status: ACTIVE | Noted: 2020-05-22

## 2020-05-26 PROBLEM — F41.9 ANXIETY DISORDER, UNSPECIFIED: Chronic | Status: ACTIVE | Noted: 2020-05-22

## 2020-05-26 LAB
ALBUMIN SERPL ELPH-MCNC: 3.5 G/DL — SIGNIFICANT CHANGE UP (ref 3.5–5.2)
ALP SERPL-CCNC: 79 U/L — SIGNIFICANT CHANGE UP (ref 30–115)
ALT FLD-CCNC: 16 U/L — SIGNIFICANT CHANGE UP (ref 0–41)
ANION GAP SERPL CALC-SCNC: 18 MMOL/L — HIGH (ref 7–14)
APTT BLD: 36.2 SEC — SIGNIFICANT CHANGE UP (ref 27–39.2)
APTT BLD: 52.4 SEC — HIGH (ref 27–39.2)
AST SERPL-CCNC: 21 U/L — SIGNIFICANT CHANGE UP (ref 0–41)
BASOPHILS # BLD AUTO: 0.02 K/UL — SIGNIFICANT CHANGE UP (ref 0–0.2)
BASOPHILS NFR BLD AUTO: 0.3 % — SIGNIFICANT CHANGE UP (ref 0–1)
BILIRUB SERPL-MCNC: 0.3 MG/DL — SIGNIFICANT CHANGE UP (ref 0.2–1.2)
BLD GP AB SCN SERPL QL: SIGNIFICANT CHANGE UP
BUN SERPL-MCNC: 67 MG/DL — CRITICAL HIGH (ref 10–20)
CALCIUM SERPL-MCNC: 8.9 MG/DL — SIGNIFICANT CHANGE UP (ref 8.5–10.1)
CHLORIDE SERPL-SCNC: 93 MMOL/L — LOW (ref 98–110)
CO2 SERPL-SCNC: 22 MMOL/L — SIGNIFICANT CHANGE UP (ref 17–32)
CREAT SERPL-MCNC: 3.8 MG/DL — HIGH (ref 0.7–1.5)
EOSINOPHIL # BLD AUTO: 0.01 K/UL — SIGNIFICANT CHANGE UP (ref 0–0.7)
EOSINOPHIL NFR BLD AUTO: 0.1 % — SIGNIFICANT CHANGE UP (ref 0–8)
GLUCOSE SERPL-MCNC: 167 MG/DL — HIGH (ref 70–99)
HCT VFR BLD CALC: 32.9 % — LOW (ref 42–52)
HGB BLD-MCNC: 10.7 G/DL — LOW (ref 14–18)
IMM GRANULOCYTES NFR BLD AUTO: 1.9 % — HIGH (ref 0.1–0.3)
INR BLD: 10.31 RATIO — CRITICAL HIGH (ref 0.65–1.3)
INR BLD: 2.61 RATIO — HIGH (ref 0.65–1.3)
LACTATE SERPL-SCNC: 1.8 MMOL/L — SIGNIFICANT CHANGE UP (ref 0.7–2)
LIDOCAIN IGE QN: 77 U/L — HIGH (ref 7–60)
LYMPHOCYTES # BLD AUTO: 0.65 K/UL — LOW (ref 1.2–3.4)
LYMPHOCYTES # BLD AUTO: 8.7 % — LOW (ref 20.5–51.1)
MCHC RBC-ENTMCNC: 26.6 PG — LOW (ref 27–31)
MCHC RBC-ENTMCNC: 32.5 G/DL — SIGNIFICANT CHANGE UP (ref 32–37)
MCV RBC AUTO: 81.8 FL — SIGNIFICANT CHANGE UP (ref 80–94)
MONOCYTES # BLD AUTO: 0.78 K/UL — HIGH (ref 0.1–0.6)
MONOCYTES NFR BLD AUTO: 10.5 % — HIGH (ref 1.7–9.3)
NEUTROPHILS # BLD AUTO: 5.84 K/UL — SIGNIFICANT CHANGE UP (ref 1.4–6.5)
NEUTROPHILS NFR BLD AUTO: 78.5 % — HIGH (ref 42.2–75.2)
NRBC # BLD: 0 /100 WBCS — SIGNIFICANT CHANGE UP (ref 0–0)
PLATELET # BLD AUTO: 347 K/UL — SIGNIFICANT CHANGE UP (ref 130–400)
POTASSIUM SERPL-MCNC: 4.3 MMOL/L — SIGNIFICANT CHANGE UP (ref 3.5–5)
POTASSIUM SERPL-SCNC: 4.3 MMOL/L — SIGNIFICANT CHANGE UP (ref 3.5–5)
PROT SERPL-MCNC: 7.2 G/DL — SIGNIFICANT CHANGE UP (ref 6–8)
PROTHROM AB SERPL-ACNC: 30 SEC — HIGH (ref 9.95–12.87)
PROTHROM AB SERPL-ACNC: >40 SEC — HIGH (ref 9.95–12.87)
RBC # BLD: 4.02 M/UL — LOW (ref 4.7–6.1)
RBC # FLD: 14.1 % — SIGNIFICANT CHANGE UP (ref 11.5–14.5)
SODIUM SERPL-SCNC: 133 MMOL/L — LOW (ref 135–146)
WBC # BLD: 7.44 K/UL — SIGNIFICANT CHANGE UP (ref 4.8–10.8)
WBC # FLD AUTO: 7.44 K/UL — SIGNIFICANT CHANGE UP (ref 4.8–10.8)

## 2020-05-26 PROCEDURE — 74176 CT ABD & PELVIS W/O CONTRAST: CPT | Mod: 26

## 2020-05-26 PROCEDURE — 71045 X-RAY EXAM CHEST 1 VIEW: CPT | Mod: 26

## 2020-05-26 PROCEDURE — 99285 EMERGENCY DEPT VISIT HI MDM: CPT

## 2020-05-26 PROCEDURE — 99232 SBSQ HOSP IP/OBS MODERATE 35: CPT

## 2020-05-26 PROCEDURE — 99223 1ST HOSP IP/OBS HIGH 75: CPT

## 2020-05-26 PROCEDURE — 93010 ELECTROCARDIOGRAM REPORT: CPT

## 2020-05-26 RX ORDER — SERTRALINE 25 MG/1
100 TABLET, FILM COATED ORAL DAILY
Refills: 0 | Status: DISCONTINUED | OUTPATIENT
Start: 2020-05-26 | End: 2020-05-29

## 2020-05-26 RX ORDER — SODIUM CHLORIDE 9 MG/ML
1000 INJECTION INTRAMUSCULAR; INTRAVENOUS; SUBCUTANEOUS ONCE
Refills: 0 | Status: COMPLETED | OUTPATIENT
Start: 2020-05-26 | End: 2020-05-26

## 2020-05-26 RX ORDER — SODIUM CHLORIDE 9 MG/ML
1000 INJECTION, SOLUTION INTRAVENOUS
Refills: 0 | Status: DISCONTINUED | OUTPATIENT
Start: 2020-05-26 | End: 2020-05-28

## 2020-05-26 RX ORDER — PHYTONADIONE (VIT K1) 5 MG
5 TABLET ORAL ONCE
Refills: 0 | Status: COMPLETED | OUTPATIENT
Start: 2020-05-26 | End: 2020-05-26

## 2020-05-26 RX ORDER — PIOGLITAZONE HYDROCHLORIDE 15 MG/1
1 TABLET ORAL
Qty: 0 | Refills: 0 | DISCHARGE

## 2020-05-26 RX ORDER — AMLODIPINE BESYLATE 2.5 MG/1
1 TABLET ORAL
Qty: 0 | Refills: 0 | DISCHARGE

## 2020-05-26 RX ORDER — WARFARIN SODIUM 2.5 MG/1
1 TABLET ORAL
Qty: 0 | Refills: 0 | DISCHARGE

## 2020-05-26 RX ORDER — SERTRALINE 25 MG/1
1 TABLET, FILM COATED ORAL
Qty: 0 | Refills: 0 | DISCHARGE

## 2020-05-26 RX ADMIN — SODIUM CHLORIDE 2000 MILLILITER(S): 9 INJECTION INTRAMUSCULAR; INTRAVENOUS; SUBCUTANEOUS at 12:47

## 2020-05-26 RX ADMIN — Medication 101 MILLIGRAM(S): at 14:44

## 2020-05-26 NOTE — H&P ADULT - ASSESSMENT
================= ASSESSMENT/PLAN ==================  Patient is a 80y old Male who presents with a chief complaint of BRBPR (26 May 2020 21:58)  Currently admitted to medicine with the primary diagnosis of Rectal bleeding    # Hematochezia: DD includes anastomotic ulcer vs AVM vs Colorectal cancer vs small bowel bleed.  Hemodynamically stable (BP low on admission now better)   Hg stable  INR of 10 on coumadin s/p 5 of vitamin K   PLAN  - clear liquid diet  - Adequate fluid resuscitation (Keep SBP > 90)  - Trend CBC every 8hrs and keep Hg > 8  - plan for Colonoscopy on Friday 5/29/2020  - Please give the colonoscopy prep ( 1 whole Gallon of Golytely, dulcolax 20mg HS with clear liquids only) to achieve transparent watery stools on the procedure day.  - Check electrolytes, INR and Hg (Hg >8, NA, K, Mg, INR levels should be normal) the day before the procedure.  - Keep NPO after midnight for the day of procedure  - Correct INR to the goal of < 1.5 (However be careful given high risk of DVT in the setting of cancer)     # History of colon cancer s/p subtotal colectomy with iliorectal anastomosis approximately 20 years ago  # Anal stenosis s/p EUA with dilation (2015)  There is mild narrowing at the ileorectal anastomosis with dilatation of the bowel proximally. Findings are again compatible with partial obstruction in the region of the anastomosis on CT scan   - Patient passing stools   - Check CEA, AFP and CA 19-9 levels  - MRI non con abdomen and pelvis   - Surgery following     # Hypertension - Controlled   Was hypotensive on admission also in the office   Amlodipine was being held    # ALEX on CKD 3 (likely pre renal given poor oral intake)   - Send urine studies   - MIVFs   - Repeat BMP   - Nephro evaluation     # Diabetes   - Monitor POC glucose   - Insulin if > 180    # Several renal cysts are noted bilaterally. On the prior scan of 5/22/2020, two small nonobstructing calcifications were noted within the collecting system of the left kidney and a 6 mm proximal left ureteral calculus was noted. On the current scan (Series 4 Images [id number]), three calcifications are noted in the renal collecting system compatible with the previous left ureteral stone moving back to the collecting system.    GI PPX: Pantoprazole   DVT PPX: SCDs    DIET: Clear liquids   ACTIVITY:  () Ad Priyanka  /  (X) Advance as Tolerated  /  () Bed Rest  /  () Fall Precaution  /  () Seizure precaution    ================= DISPOSITION ==================    Patient to be discharged when condition(s) optimized.            Discharge to: Home when cleared              Home services:              Counseled on/Discussed cessation of:  () Risky behaviors  /  () Smoking cessation  /  () Diet  /  () Exercise  /  () Other    ================= CODE STATUS =================                  (X) FULL CODE     |     () DNR     |     () DNI    () Discussion with patient and/or family regarding goals of care ================= ASSESSMENT/PLAN ==================  Patient is a 80y old Male who presents with a chief complaint of BRBPR (26 May 2020 21:58)  Currently admitted to medicine with the primary diagnosis of Rectal bleeding    # Hematochezia: DD includes anastomotic ulcer vs AVM vs Colorectal cancer vs small bowel bleed.  Hemodynamically stable (BP low on admission now better)   Hg stable  INR of 10 on coumadin s/p 5 of vitamin K possibly due to levaquin   PLAN  - clear liquid diet  - Adequate fluid resuscitation (Keep SBP > 90)  - Trend CBC every 8hrs and keep Hg > 8  - plan for Colonoscopy on Friday 5/29/2020  - Please give the colonoscopy prep ( 1 whole Gallon of Golytely, dulcolax 20mg HS with clear liquids only) to achieve transparent watery stools on the procedure day.  - Check electrolytes, INR and Hg (Hg >8, NA, K, Mg, INR levels should be normal) the day before the procedure.  - Keep NPO after midnight for the day of procedure  - Correct INR to the goal of < 1.5 (However be careful given high risk of DVT in the setting of cancer)     # History of colon cancer s/p subtotal colectomy with iliorectal anastomosis approximately 20 years ago  # Anal stenosis s/p EUA with dilation (2015)  There is mild narrowing at the ileorectal anastomosis with dilatation of the bowel proximally. Findings are again compatible with partial obstruction in the region of the anastomosis on CT scan   - Patient passing stools   - Check CEA, AFP and CA 19-9 levels  - MRI non con abdomen and pelvis   - Surgery following     # Hypertension - Controlled   Was hypotensive on admission also in the office   Amlodipine was being held    # ALEX on CKD 3 (likely pre renal given poor oral intake)   - Send urine studies   - MIVFs   - Repeat BMP   - Nephro evaluation     # Diabetes   - Monitor POC glucose   - Insulin if > 180    # Non infected sacral ulcer   - Hold levaquin   - Burn evaluation   - Local wound care     # Several renal cysts are noted bilaterally. On the prior scan of 5/22/2020, two small nonobstructing calcifications were noted within the collecting system of the left kidney and a 6 mm proximal left ureteral calculus was noted. On the current scan (Series 4 Images [id number]), three calcifications are noted in the renal collecting system compatible with the previous left ureteral stone moving back to the collecting system.    GI PPX: Pantoprazole   DVT PPX: SCDs    DIET: Clear liquids   ACTIVITY:  () Ad Priyanka  /  (X) Advance as Tolerated  /  () Bed Rest  /  () Fall Precaution  /  () Seizure precaution    ================= DISPOSITION ==================    Patient to be discharged when condition(s) optimized.            Discharge to: Home when cleared              Home services:              Counseled on/Discussed cessation of:  () Risky behaviors  /  () Smoking cessation  /  () Diet  /  () Exercise  /  () Other    ================= CODE STATUS =================                  (X) FULL CODE     |     () DNR     |     () DNI    () Discussion with patient and/or family regarding goals of care

## 2020-05-26 NOTE — ED PROVIDER NOTE - PROGRESS NOTE DETAILS
GI c/s after pt evaluation. will call again once results available. declan H&H appears at baseline, patient remains stable. Endorsed to Dr Archibald to follow up imaging studies GI consult and admit. Dr. Archibald: Received sign out from Dr. Solomno  Patient pending labs, CT and will then be admitted for GI bleed Dr. Jay Scott ([phone]) for admission.   He would like the in patient team to contact him. surgery c/s given ct read, abdon deluca, will evaluate. zw cleared from surg, mar aware of admission, will follow rpt inr.

## 2020-05-26 NOTE — H&P ADULT - GASTROINTESTINAL DETAILS
bowel sounds normal/no masses palpable/no guarding/nontender/no distention/normal/no rebound tenderness/no rigidity/soft

## 2020-05-26 NOTE — ED PROVIDER NOTE - CLINICAL SUMMARY MEDICAL DECISION MAKING FREE TEXT BOX
81 yo M presented to ED for rectal bleeding. Patient had brown stool on exam but INR elevated >10. Vitamin K given and patient admitted for further management.

## 2020-05-26 NOTE — CONSULT NOTE ADULT - ASSESSMENT
A/P:  80 year old male with hx of HTN, HLD, CKD, DVT ( diagnosed ~ 4 years ago, on coumadin 2.5mg dailt), colon cancer s/p subtotal colectomy with iliorectal anastomosis approximately 20 years ago, right inguinal hernia repair with mesh (2009 Dr. Noyola), and anal stenosis s/p EUA with dilation (2015) presents to the ED upon referral by his PMD (Dr. Scott) for an elevated INR on lab work this morning. He states associated abdominal pain associated with rectal bleeding. Patient states that his abdominal pain has been present for 5 days, located to his lower quadrants, crampy in nature, and non-radiating. His pain is associated with dark bloody bowel movements for the past 3 days. His GI is Dr. Camacho and has a colonoscopy 3 years ago showing unremarkable anastomosis, and some polyps that were removed. He denies any fevers, chills, chest pain, sob, nausea, vomiting, hemoptysis, hematuria. Patient states that he is passing flatus today, and had a bowel movement 1 hour prior to examining him.    PLAN:  No acute surgical intervention  Clinically non-obstructed  Pain control  Recommend bowel rest  Fluid resuscitation  F/u GI for colonoscopy - possible dilation of iliorectal anastomosis  Monitor for bloody bowel movements  Serial H&H

## 2020-05-26 NOTE — CONSULT NOTE ADULT - SUBJECTIVE AND OBJECTIVE BOX
MARIELENA REYES [id number]  80y Male    HPI: 80 year old male with hx of HTN, HLD, CKD, DVT ( diagnosed ~ 4 years ago, on coumadin 2.5mg dailt), colon cancer s/p subtotal colectomy with iliorectal anastomosis approximately 20 years ago, right inguinal hernia repair with mesh (2009 Dr. Noyola), and anal stenosis s/p EUA with dilation (2015) presents to the ED upon referral by his PMD (Dr. Scott) for an elevated INR on lab work this morning. He states associated abdominal pain associated with rectal bleeding. Patient states that his abdominal pain has been present for 5 days, located to his lower quadrants, crampy in nature, and non-radiating. His pain is associated with dark bloody bowel movements for the past 3 days. His GI is Dr. Camacho and has a colonoscopy 3 years ago showing unremarkable anastomosis, and some polyps that were removed. He denies any fevers, chills, chest pain, sob, nausea, vomiting, hemoptysis, hematuria. Patient states that he is passing flatus today, and had a bowel movement 1 hour prior to examining him.    PAST MEDICAL & SURGICAL HISTORY:  Hypertension, unspecified type  High cholesterol  Anxiety      Allergies    No Known Allergies    Intolerances    REVIEW OF SYSTEMS    [x] A ten-point review of systems was otherwise negative except as noted.  [ ] Due to altered mental status/intubation, subjective information were not able to be obtained from the patient. History was obtained, to the extent possible, from review of the chart and collateral sources of information.      Vital Signs Last 24 Hrs  T(C): 35.8 (26 May 2020 16:30), Max: 36.8 (26 May 2020 11:59)  T(F): 96.4 (26 May 2020 16:30), Max: 98.2 (26 May 2020 11:59)  HR: 73 (26 May 2020 16:30) (73 - 74)  BP: 132/57 (26 May 2020 16:30) (94/53 - 132/57)  BP(mean): --  RR: 18 (26 May 2020 16:30) (18 - 19)  SpO2: 96% (26 May 2020 16:30) (96% - 97%)    PHYSICAL EXAM:  GENERAL: NAD, well-appearing  CHEST/LUNG: Clear to auscultation bilaterally  HEART: Regular rate and rhythm  ABDOMEN: Soft, mild tenderness to lower quadrants, mildly distended, no guarding or rebound  MELANI: stage 3 decubital ulcers on buttocks, dark blood on melani  EXTREMITIES:  No clubbing, cyanosis, or edema      LABS:  Labs:  CAPILLARY BLOOD GLUCOSE                          10.7   7.44  )-----------( 347      ( 26 May 2020 12:30 )             32.9       Auto Neutrophil %: 78.5 % (05-26-20 @ 12:30)  Auto Immature Granulocyte %: 1.9 % (05-26-20 @ 12:30)    05-26    133<L>  |  93<L>  |  67<HH>  ----------------------------<  167<H>  4.3   |  22  |  3.8<H>    Calcium, Total Serum: 8.9 mg/dL (05-26-20 @ 12:30)    LFTs:             7.2  | 0.3  | 21       ------------------[79      ( 26 May 2020 12:30 )  3.5  | x    | 16          Lipase:77     Amylase:x         Lactate, Blood: 1.8 mmol/L (05-26-20 @ 12:30)    Coags:     >40.00  ----< 10.31   ( 26 May 2020 12:30 )     52.4          RADIOLOGY & ADDITIONAL STUDIES:  < from: CT Abdomen and Pelvis No Cont (05.26.20 @ 17:07) >    IMPRESSION: Status post colectomy. There is mild narrowing at the ileorectal anastomosis with dilatation of the bowel proximally. Findings are again compatible with partial obstruction in the region of the anastomosis..    No evidence of ascites or free air within the abdomen or pelvis.    < end of copied text >

## 2020-05-26 NOTE — ED ADULT TRIAGE NOTE - CHIEF COMPLAINT QUOTE
Patient presents with dark stool and high coumadin level for over a week. Patient is alert and oriented. Patients daughter states he has been on antibiotics Levaquin x 2 doses for ulcer to sacrum and since then has been dizzy. Patient with increased difficulty walking over the past few weeks.

## 2020-05-26 NOTE — ED PROVIDER NOTE - NS ED ROS FT
Constitutional: (-) fever (-) vomiting  Eyes/ENT: (-) eye pain  Cardiovascular: (-) chest pain, (+) sob  Respiratory: (-) cough, (+) shortness of breath  Gastrointestinal: (-) vomiting, (-) diarrhea, (+) abdominal pain  : (-) dysuria   Musculoskeletal: (-) back pain  Integumentary: (-) rash, (-) edema  Neurological: (-)loc  Allergic/Immunologic: (-) pruritus  Endocrine: No history of diabetes.

## 2020-05-26 NOTE — ED ADULT TRIAGE NOTE - HEART RATE (BEATS/MIN)
[facility] - Visit Note                                           Chief Complaint  64 y.o. female with COVID-19 Concerns    History of Present Illness  Maggie Acevedo presents to the urgent care with request for antibody testing for COVID. No symptoms.     Past Medical History:   Diagnosis Date    Benign neoplasm of colon     HTN (hypertension) 3/25/2013    Mitral valve prolapse 3/25/2013    Osteopenia 1/4/2016    Personal history of colonic polyps 11/27/2012    Situational anxiety 3/25/2013    Unspecified essential hypertension     Essential hypertension    Vitamin D deficiency disease 5/2/2014     Past Surgical History:   Procedure Laterality Date    COLONOSCOPY N/A 5/2/2018    Procedure: COLONOSCOPY;  Surgeon: Cris Urrutia MD;  Location: [facility];  Service: Endoscopy;  Laterality: N/A;  confirmed appt 4/24/18    HYSTERECTOMY      12 yrs ago      Review of patient's allergies indicates:  No Known Allergies     Review of Systems and Physical Exam     Review of Systems  -- Constitution - no fever, no weight loss, no loss of consciousness  -- Eyes - no changes in vision, no redness, no swelling, no discharge  -- Ear, Nose - no  earache, no loss of hearing, no epistaxis  -- Mouth,Throat - no sore throat, no toothache, normal voice, normal swallowing  -- Respiratory - denies cough and congestion, no shortness of breath, no wheezing, no increased WOB   -- Cardiovascular - denies chest pain, no palpitations, no lower extremity edema  -- Gastrointestinal - denies abdominal pain, denies nausea, vomiting, and diarrhea  -- Genitourinary - no dysuria, denies flank pain, no hematuria or frequency   -- Musculoskeletal - denies back pain, negative for myalgias and arthralgias   -- Neurological - no headache, no neurologic changes, no loss of bladder or bowel function no seizure like activity, no changes in hearing or vision  -- Skin - denies skin changes, no rash, no hives, no suspected skin  "infection    Vital Signs   height is 5' 6" (1.676 m) and weight is 61.2 kg (135 lb). Her temperature is 97.9 °F (36.6 °C). Her blood pressure is 120/80 and her pulse is 92. Her oxygen saturation is 98%.      Physical Exam not done    Treatment Course, Evaluation, and Medical Decision Makin. PE not done,   2. Patient counseled on interpretation of ab test  3. covid swab and ab test pending    Diagnosis  -- The encounter diagnosis was Encounter for screening for other viral diseases.    Disposition and Plan  -- Disposition: home  -- Condition: stable  -- Follow-up: Patient to follow up with Ramsey Do MD in 1-2 days, and any specialists noted on discharge paperwork  -- I advised the patient that we have found no life threatening condition today and have provided recommendations his/her care  -- At this time, I believe the patient is clinically stable for discharge.   -- The patient acknowledges that ongoing follow up with a MD is required   -- Patient agrees to comply with all instruction and direction given in the urgent care  -- Patient counseled on strict return precautions as discussed  " 74

## 2020-05-26 NOTE — ED PROVIDER NOTE - PHYSICAL EXAMINATION
CONSTITUTIONAL: Well-developed; well-nourished; in no acute distress, speaking in full sentences  SKIN: warm, dry  HEAD: Normocephalic; atraumatic  EYES: PERRL, EOMI, no conjunctival erythema  ENT: No nasal discharge; airway clear, mucous membranes moist  NECK: Supple; non tender, FROM  CARD: +S1, S2 no murmurs, gallops, or rubs. Regular rate and rhythm. radial 2+  RESP: No wheezes, rales or rhonchi. CTABL  ABD: soft nd, no rebound, no guarding, no rigidity, +ttp rlq and suprapubic   EXT: moves all extremities No clubbing, cyanosis or edema.   NEURO: Alert, oriented, grossly unremarkable, no focal deficits, cn ii-xii grossly intact  PSYCH: Cooperative, appropriate   rectal exam done w pca jacob present: +sacral decubitus ulcer, +bright red blood on EROS

## 2020-05-26 NOTE — CONSULT NOTE ADULT - ATTENDING COMMENTS
80M with multiple medical comorbidities including HTN, HLD, CKD, DVT (on coumadin) presents with lower GI bleeding and supratherapeutic INR.  He states he had a total colectomy with ileorectal anastomosis for cancer in 1999.  He denies chemotherapy or radiation.  He has had multiple episodes of anal stenosis requiring surgical intervention. His GI is Dr. Camacho and has a colonoscopy 3 years ago showing unremarkable anastomosis, and some polyps that were removed. We do not have that report.     Patient seen and examined.  He reports mild lower abdominal pain and a brown BM this morning.  He states he has multiple loose stools daily. Denies obstructive type symptoms.    Abdomen - soft non tender non distended    labs and images reviewed    CTAP - Status post colectomy. There is mild narrowing at the ileorectal anastomosis with dilatation of the bowel proximally. Findings are again compatible with partial obstruction in the region of the anastomosis.    on my evaluation of the CT scan there appears to be a J pouch given the location of the staple lines however this is not mentioned in any of his documentation.    PLAN:  - Correct INR  - trend Hgb  - transfuse as appropriate  - GI for endoscopic evaluation  - colorectal surgery to follow
81 yo male with rectal bleeding on anticoagulation, with INR of  10.1  Rec  hold coumadin  Correct coagulopathy with Vit K  Tentative colonoscopy on Friday  Follow cbc, transfuse prn  clear liquid diet.

## 2020-05-26 NOTE — ED PROVIDER NOTE - CARE PLAN
Principal Discharge DX:	Rectal bleeding  Secondary Diagnosis:	Sacral ulcer  Secondary Diagnosis:	GI bleed  Secondary Diagnosis:	Elevated INR

## 2020-05-26 NOTE — ED ADULT NURSE NOTE - OBJECTIVE STATEMENT
Patient presents with dark stool and high coumadin level for over a week. Patient is a&o. Patients daughter states he has been on antibiotics Levaquin x 2 doses for ulcer to sacrum and since then has been dizzy. Patient with increased difficulty walking over the past few weeks.

## 2020-05-26 NOTE — ED ADULT NURSE REASSESSMENT NOTE - NS ED NURSE REASSESS COMMENT FT1
assumed care of pt. he is resting with eyes closed, respirations even and unlabored, pt denies and complaints @ this time. will continue to monitor pt.

## 2020-05-26 NOTE — ED PROVIDER NOTE - OBJECTIVE STATEMENT
79yo M htn, hld, ckd, decub ulcer, le DVTs on coumadin, colon ca about 20 yrs ago s/p colectomy presents CC elevated INR, told it was 8 today at clinic after taking his morning dose and rectal bleeding x 3 days. pt reports 8 days of generalized weakness.     pmd mal fitzgerald

## 2020-05-26 NOTE — ED PROVIDER NOTE - ATTENDING CONTRIBUTION TO CARE
81 yo male PMH DVT on Coumadin, HTN, elevated cholesterol, anxiety, sacral decubitus ulcer c/o dark stools and rectal bleeding for 3 days associated with abdominal pain which started today.  Decided to come for evaluation today after his doctor advised him to do so.  Denies CP, SOB, hemoptysis, N/V, no hematuria or any other additional acute complaints.  Elderly male resting on a stretcher, awake and alert, NAD, PERRL, nml work of breathing, pulses equal b/l, abdomen soft, right sided ttp without rebound or guarding, no calf ttp, b/l lower leg chronic skin changes, awake and alert, no gross neuro deficits.  Will check labs, plan to admit for further work up.  Patient is amenable with the plan.

## 2020-05-26 NOTE — H&P ADULT - NSHPLABSRESULTS_GEN_ALL_CORE
===================== LABS =====================                        10.7   7.44  )-----------( 347      ( 26 May 2020 12:30 )             32.9     05-26    133<L>  |  93<L>  |  67<HH>  ----------------------------<  167<H>  4.3   |  22  |  3.8<H>    Ca    8.9      26 May 2020 12:30    TPro  7.2  /  Alb  3.5  /  TBili  0.3  /  DBili  x   /  AST  21  /  ALT  16  /  AlkPhos  79  05-26    PT/INR - ( 26 May 2020 12:30 )   PT: >40.00 sec;   INR: 10.31 ratio         PTT - ( 26 May 2020 12:30 )  PTT:52.4 sec      Lactate, Blood: 1.8 mmol/L (05-26-20 @ 12:30)        ================= MICROBIOLOGY ================    ================== IMAGING ==================  < from: CT Abdomen and Pelvis No Cont (05.26.20 @ 17:07) >  LOWER CHEST: Stable paraseptal emphysematous changes and subpleural reticulations at the lung bases. No pleural or pericardial effusion.  HEPATIC: The liver is normal in appearance with no evidence of mass or bile duct dilatation.    BILIARY: No calcified gallstones are noted.   SPLEEN: Unremarkable.      PANCREAS: The pancreas is normal in size and configuration. No evidence of mass or pancreatitis.   ADRENAL GLANDS: Stable fullness of both adrenal glands.  KIDNEYS: There is symmetric bilateral renal enhancement. No evidence of hydronephrosisor solid mass. Several renal cysts are noted bilaterally. On the prior scan of 5/22/2020, two small nonobstructing calcifications were noted within the collecting system of the left kidney and a 6 mm proximal left ureteral calculus was noted. On the current scan (Series 4 Images [id number]), three calcifications are noted in the renal collecting system compatible with the previous left ureteral stone moving back to the collecting system.  ABDOMINOPELVIC NODES: Unremarkable.  PELVIC ORGANS: No evidence of pelvic mass, lymphadenopathy, or fluid collection.      PERITONEUM/MESENTERY/BOWEL: Patient is again noted to be status post colectomy. There is mild narrowing at the ileorectal anastomosis with dilatation of the bowel proximally. Findings are again compatible with partial obstruction in the region of the anastomosis. No evidence of ascites or free air within the abdomen or pelvis.  BONES/SOFT TISSUES: Degenerative changes of the spine are noted.  OTHER: Aortoiliac calcifications are noted with no ectasia of the infrarenal abdominal aorta, measuring 2.9 cm at its widest diameter.  IMPRESSION: Status post colectomy. There is mild narrowing at the ileorectal anastomosis with dilatation of the bowel proximally. Findings are again compatible with partial obstruction in the region of the anastomosis..  No evidence of ascites or free air within the abdomen or pelvis.

## 2020-05-26 NOTE — ED ADULT NURSE NOTE - NSIMPLEMENTINTERV_GEN_ALL_ED
Implemented All Fall with Harm Risk Interventions:  [place] to call system. Call bell, personal items and telephone within reach. Instruct patient to call for assistance. Room bathroom lighting operational. Non-slip footwear when patient is off stretcher. Physically safe environment: no spills, clutter or unnecessary equipment. Stretcher in lowest position, wheels locked, appropriate side rails in place. Provide visual cue, wrist band, yellow gown, etc. Monitor gait and stability. Monitor for mental status changes and reorient to person, place, and time. Review medications for side effects contributing to fall risk. Reinforce activity limits and safety measures with patient and family. Provide visual clues: red socks.

## 2020-05-26 NOTE — CONSULT NOTE ADULT - SUBJECTIVE AND OBJECTIVE BOX
Gastroenterology Consultation:    Patient is a 80y old  Male who presents with a chief complaint of hematochezia    Admitted on: 05-26-20    HPI: 81 y/o Male with PMH of CKD, HTN, HLD, and DVT on Coumadin presented to the ED with complaint of bright red blood per rectum.      COVID 19: Pending    Prior records Reviewed (Y/N):  History obtained from person other than patient (Y/N):    Prior EGD/Colonoscopy: None in system        PAST MEDICAL & SURGICAL HISTORY:  Hypertension, unspecified type  High cholesterol  Anxiety      FAMILY HISTORY:      Social History:  Tobacco:  Alcohol:  Drugs:    Home Medications:    MEDICATIONS  (STANDING):  phytonadione  IVPB 5 milliGRAM(s) IV Intermittent once    MEDICATIONS  (PRN):      Allergies  No Known Allergies      Review of Systems:   Constitutional:  No Fever, No Chills  ENT/Mouth:  No Hearing Changes,  No Difficulty Swallowing  Eyes:  No Eye Pain, No Vision Changes  Cardiovascular:  No Chest Pain, No Palpitations  Respiratory:  No Cough, No Dyspnea  Gastrointestinal:  As described in HPI  Musculoskeletal:  No Joint Swelling, No Back Pain  Skin:  No Skin Lesions, No Jaundice  Neuro:  No Syncope, No Dizziness  Heme/Lymph:  No Bruising, No Bleeding.          Physical Examination:  T(C): 36.8 (05-26-20 @ 11:59), Max: 36.8 (05-26-20 @ 11:59)  HR: 74 (05-26-20 @ 11:59) (74 - 74)  BP: 94/53 (05-26-20 @ 11:59) (94/53 - 94/53)  RR: 19 (05-26-20 @ 11:59) (19 - 19)  SpO2: 97% (05-26-20 @ 11:59) (97% - 97%)        Constitutional: No acute distress.  Eyes:. Conjunctivae are clear, Sclera is non-icteric.  Ears Nose and Throat: The external ears are normal appearing,  Oral mucosa is pink and moist.  Respiratory:  No signs of respiratory distress. Lung sounds are clear bilaterally.  Cardiovascular:  S1 S2, Regular rate and rhythm.  GI: Abdomen is soft, symmetric, and non-tender without distention. There are no visible lesions. Bowel sounds are present and normoactive in all four quadrants. No masses, hepatomegaly, or splenomegaly are noted.   Neuro: No Tremor, No involuntary movements  Skin: No rashes, No Jaundice.          Data: (reviewed by attending)                        10.7   7.44  )-----------( 347      ( 26 May 2020 12:30 )             32.9     Hgb Trend:  10.7  05-26-20 @ 12:30            Liver panel trend:  TBili 0.4   /   AST 13   /   ALT 10   /   AlkP 69   /   Tptn 6.8   /   Alb 3.3    /   DBili --      05-22      PT/INR - ( 26 May 2020 12:30 )   PT: >40.00 sec;   INR: 10.31 ratio         PTT - ( 26 May 2020 12:30 )  PTT:52.4 sec        Radiology:(reviewed by attending) Gastroenterology Consultation:    Patient is a 80y old  Male who presents with a chief complaint of hematochezia    Admitted on: 05-26-20    HPI: 81 y/o Male with PMH of CKD, HTN, HLD, anal stenosis/strictures s/p surgery few yrs ago, Colon cancer? s/p colectomy in 1999 (in remission never had chemo or radiation) and DVT on Coumadin presented to the ED with complaint of bright red blood per rectum. Started 5 days ago, loose stools mixed with half a cup of bright red blood, 3-4 episodes daily.      COVID 19: Pending    Prior records Reviewed (Y/N):  History obtained from person other than patient (Y/N):    Prior EGD/Colonoscopy: None in system  Last EGD 3yrs ago: Unremarkable  Colonoscopy: 1/5th of colon left and otherwise unremarkable, anastomosis seen         PAST MEDICAL & SURGICAL HISTORY:  Hypertension, unspecified type  High cholesterol  Anxiety      FAMILY HISTORY: NO      Social History:  Tobacco: No  Alcohol: No      Home Medications:    MEDICATIONS  (STANDING):  phytonadione  IVPB 5 milliGRAM(s) IV Intermittent once    MEDICATIONS  (PRN):      Allergies  No Known Allergies      Review of Systems:   Constitutional:  No Fever, No Chills  ENT/Mouth:  No Hearing Changes,  No Difficulty Swallowing  Eyes:  No Eye Pain, No Vision Changes  Cardiovascular:  No Chest Pain, No Palpitations  Respiratory:  No Cough, No Dyspnea  Gastrointestinal:  As described in HPI  Musculoskeletal:  No Joint Swelling, No Back Pain  Skin:  No Skin Lesions, No Jaundice  Neuro:  No Syncope, No Dizziness  Heme/Lymph:  No Bruising          Physical Examination:  T(C): 36.8 (05-26-20 @ 11:59), Max: 36.8 (05-26-20 @ 11:59)  HR: 74 (05-26-20 @ 11:59) (74 - 74)  BP: 94/53 (05-26-20 @ 11:59) (94/53 - 94/53)  RR: 19 (05-26-20 @ 11:59) (19 - 19)  SpO2: 97% (05-26-20 @ 11:59) (97% - 97%)        Constitutional: No acute distress.  Eyes:. Conjunctivae are clear, Sclera is non-icteric.  Ears Nose and Throat: The external ears are normal appearing,  Oral mucosa is pink and moist.  Respiratory:  No signs of respiratory distress. Lung sounds are clear bilaterally.  Cardiovascular:  S1 S2, Regular rate and rhythm.  GI: Abdomen is soft, symmetric, and non-tender without distention  Neuro: No Tremor, No involuntary movements  Skin: No rashes, No Jaundice.          Data: (reviewed by attending)                        10.7   7.44  )-----------( 347      ( 26 May 2020 12:30 )             32.9     Hgb Trend:  10.7  05-26-20 @ 12:30            Liver panel trend:  TBili 0.4   /   AST 13   /   ALT 10   /   AlkP 69   /   Tptn 6.8   /   Alb 3.3    /   DBili --      05-22      PT/INR - ( 26 May 2020 12:30 )   PT: >40.00 sec;   INR: 10.31 ratio         PTT - ( 26 May 2020 12:30 )  PTT:52.4 sec        Radiology:(reviewed by attending)

## 2020-05-26 NOTE — H&P ADULT - NSICDXPASTMEDICALHX_GEN_ALL_CORE_FT
PAST MEDICAL HISTORY:  Anxiety     CKD (chronic kidney disease)     Colorectal tumor     High cholesterol     Hypertension, unspecified type

## 2020-05-26 NOTE — H&P ADULT - HISTORY OF PRESENT ILLNESS
[80 year old man]    CC: BRBPR     80 year old male with hx of HTN, HLD, CKD 3, DVT possibly provoked due to cancer ( diagnosed ~ 4 years ago, on coumadin 2.5mg daily), colon cancer s/p subtotal colectomy with iliorectal anastomosis approximately 20 years ago, right inguinal hernia repair with mesh (2009 Dr. Noyola), and anal stenosis s/p EUA with dilation (2015).  His GI is Dr. Camacho and has a colonoscopy 3 years ago showing unremarkable anastomosis, and some polyps that were removed.     Presents to the ED upon referral by his PMD (Dr. Scott) for an elevated INR on lab work this morning. He states associated abdominal pain associated with rectal bleeding. Patient states that his abdominal pain has been present for 5 days, located to his lower quadrants, crampy in nature, and non-radiating. His pain is associated with dark bloody bowel movements for the past 3 days. He denies any fevers, chills, chest pain, sob, nausea, vomiting, hemoptysis, hematuria. Patient states that he is passing flatus today, and had a bowel movement     In the ED, the patient was hypotensive with response to fluids. Hb was stable at 10.6.

## 2020-05-27 LAB
AFP-TM SERPL-MCNC: <1.8 NG/ML — SIGNIFICANT CHANGE UP
ALBUMIN SERPL ELPH-MCNC: 3.1 G/DL — LOW (ref 3.5–5.2)
ALP SERPL-CCNC: 67 U/L — SIGNIFICANT CHANGE UP (ref 30–115)
ALT FLD-CCNC: 15 U/L — SIGNIFICANT CHANGE UP (ref 0–41)
ANION GAP SERPL CALC-SCNC: 19 MMOL/L — HIGH (ref 7–14)
APTT BLD: 33 SEC — SIGNIFICANT CHANGE UP (ref 27–39.2)
APTT BLD: 34.2 SEC — SIGNIFICANT CHANGE UP (ref 27–39.2)
AST SERPL-CCNC: 17 U/L — SIGNIFICANT CHANGE UP (ref 0–41)
BASOPHILS # BLD AUTO: 0.01 K/UL — SIGNIFICANT CHANGE UP (ref 0–0.2)
BASOPHILS NFR BLD AUTO: 0.2 % — SIGNIFICANT CHANGE UP (ref 0–1)
BILIRUB DIRECT SERPL-MCNC: 0.2 MG/DL — SIGNIFICANT CHANGE UP (ref 0–0.2)
BILIRUB INDIRECT FLD-MCNC: 0.3 MG/DL — SIGNIFICANT CHANGE UP (ref 0.2–1.2)
BILIRUB SERPL-MCNC: 0.5 MG/DL — SIGNIFICANT CHANGE UP (ref 0.2–1.2)
BUN SERPL-MCNC: 64 MG/DL — CRITICAL HIGH (ref 10–20)
CALCIUM SERPL-MCNC: 8.5 MG/DL — SIGNIFICANT CHANGE UP (ref 8.5–10.1)
CANCER AG19-9 SERPL-ACNC: 2 U/ML — SIGNIFICANT CHANGE UP
CEA SERPL-MCNC: 2.4 NG/ML — SIGNIFICANT CHANGE UP (ref 0–3.8)
CHLORIDE SERPL-SCNC: 97 MMOL/L — LOW (ref 98–110)
CO2 SERPL-SCNC: 18 MMOL/L — SIGNIFICANT CHANGE UP (ref 17–32)
CREAT SERPL-MCNC: 3.4 MG/DL — HIGH (ref 0.7–1.5)
CULTURE RESULTS: SIGNIFICANT CHANGE UP
CULTURE RESULTS: SIGNIFICANT CHANGE UP
EOSINOPHIL # BLD AUTO: 0.04 K/UL — SIGNIFICANT CHANGE UP (ref 0–0.7)
EOSINOPHIL NFR BLD AUTO: 0.8 % — SIGNIFICANT CHANGE UP (ref 0–8)
GLUCOSE SERPL-MCNC: 141 MG/DL — HIGH (ref 70–99)
HCT VFR BLD CALC: 30.3 % — LOW (ref 42–52)
HGB BLD-MCNC: 10.1 G/DL — LOW (ref 14–18)
IMM GRANULOCYTES NFR BLD AUTO: 2.4 % — HIGH (ref 0.1–0.3)
INR BLD: 1.51 RATIO — HIGH (ref 0.65–1.3)
INR BLD: 1.74 RATIO — HIGH (ref 0.65–1.3)
INR BLD: 2.28 RATIO — HIGH (ref 0.65–1.3)
LYMPHOCYTES # BLD AUTO: 0.65 K/UL — LOW (ref 1.2–3.4)
LYMPHOCYTES # BLD AUTO: 12.2 % — LOW (ref 20.5–51.1)
MAGNESIUM SERPL-MCNC: 2.2 MG/DL — SIGNIFICANT CHANGE UP (ref 1.8–2.4)
MCHC RBC-ENTMCNC: 27.7 PG — SIGNIFICANT CHANGE UP (ref 27–31)
MCHC RBC-ENTMCNC: 33.3 G/DL — SIGNIFICANT CHANGE UP (ref 32–37)
MCV RBC AUTO: 83 FL — SIGNIFICANT CHANGE UP (ref 80–94)
MONOCYTES # BLD AUTO: 0.72 K/UL — HIGH (ref 0.1–0.6)
MONOCYTES NFR BLD AUTO: 13.5 % — HIGH (ref 1.7–9.3)
NEUTROPHILS # BLD AUTO: 3.78 K/UL — SIGNIFICANT CHANGE UP (ref 1.4–6.5)
NEUTROPHILS NFR BLD AUTO: 70.9 % — SIGNIFICANT CHANGE UP (ref 42.2–75.2)
NRBC # BLD: 0 /100 WBCS — SIGNIFICANT CHANGE UP (ref 0–0)
PLATELET # BLD AUTO: 290 K/UL — SIGNIFICANT CHANGE UP (ref 130–400)
POTASSIUM SERPL-MCNC: 3.7 MMOL/L — SIGNIFICANT CHANGE UP (ref 3.5–5)
POTASSIUM SERPL-SCNC: 3.7 MMOL/L — SIGNIFICANT CHANGE UP (ref 3.5–5)
PROT SERPL-MCNC: 6.5 G/DL — SIGNIFICANT CHANGE UP (ref 6–8)
PROTHROM AB SERPL-ACNC: 17.4 SEC — HIGH (ref 9.95–12.87)
PROTHROM AB SERPL-ACNC: 20 SEC — HIGH (ref 9.95–12.87)
PROTHROM AB SERPL-ACNC: 26.2 SEC — HIGH (ref 9.95–12.87)
RBC # BLD: 3.65 M/UL — LOW (ref 4.7–6.1)
RBC # FLD: 14.1 % — SIGNIFICANT CHANGE UP (ref 11.5–14.5)
SODIUM SERPL-SCNC: 134 MMOL/L — LOW (ref 135–146)
SPECIMEN SOURCE: SIGNIFICANT CHANGE UP
SPECIMEN SOURCE: SIGNIFICANT CHANGE UP
WBC # BLD: 5.33 K/UL — SIGNIFICANT CHANGE UP (ref 4.8–10.8)
WBC # FLD AUTO: 5.33 K/UL — SIGNIFICANT CHANGE UP (ref 4.8–10.8)

## 2020-05-27 PROCEDURE — 99232 SBSQ HOSP IP/OBS MODERATE 35: CPT

## 2020-05-27 PROCEDURE — 74182 MRI ABDOMEN W/CONTRAST: CPT | Mod: 26

## 2020-05-27 RX ORDER — SODIUM HYPOCHLORITE 0.125 %
1 SOLUTION, NON-ORAL MISCELLANEOUS
Refills: 0 | Status: DISCONTINUED | OUTPATIENT
Start: 2020-05-27 | End: 2020-05-29

## 2020-05-27 RX ORDER — SODIUM HYPOCHLORITE 0.125 %
1 SOLUTION, NON-ORAL MISCELLANEOUS
Refills: 0 | Status: DISCONTINUED | OUTPATIENT
Start: 2020-05-27 | End: 2020-05-27

## 2020-05-27 RX ORDER — SOD SULF/SODIUM/NAHCO3/KCL/PEG
4000 SOLUTION, RECONSTITUTED, ORAL ORAL ONCE
Refills: 0 | Status: COMPLETED | OUTPATIENT
Start: 2020-05-27 | End: 2020-05-27

## 2020-05-27 RX ORDER — COLLAGENASE CLOSTRIDIUM HIST. 250 UNIT/G
1 OINTMENT (GRAM) TOPICAL
Refills: 0 | Status: DISCONTINUED | OUTPATIENT
Start: 2020-05-27 | End: 2020-05-29

## 2020-05-27 RX ADMIN — Medication 4000 MILLILITER(S): at 18:35

## 2020-05-27 RX ADMIN — Medication 1 APPLICATION(S): at 18:36

## 2020-05-27 RX ADMIN — Medication 1 TABLET(S): at 11:34

## 2020-05-27 RX ADMIN — SERTRALINE 100 MILLIGRAM(S): 25 TABLET, FILM COATED ORAL at 11:33

## 2020-05-27 RX ADMIN — SODIUM CHLORIDE 60 MILLILITER(S): 9 INJECTION, SOLUTION INTRAVENOUS at 08:05

## 2020-05-27 RX ADMIN — Medication 1 APPLICATION(S): at 06:53

## 2020-05-27 RX ADMIN — SODIUM CHLORIDE 60 MILLILITER(S): 9 INJECTION, SOLUTION INTRAVENOUS at 01:30

## 2020-05-27 RX ADMIN — Medication 20 MILLIGRAM(S): at 18:35

## 2020-05-27 NOTE — PROGRESS NOTE ADULT - SUBJECTIVE AND OBJECTIVE BOX
Pt well known to us, hx of HTN, DM, CKD, colon CA s/p resection and anastomosis, anal fissure in the past; on coumadin for DVTs, had had a recent significant weight loss, particularly 17 lbs in the last 7-10 days leading to this admission.  Pt had been growing steadily weaker.  Two CT scans with only oral contrast showed no sig pathology in the abdomen or pelvis, and the pt's labs were generally within normal limits.  The last week, the patient started to suffer from an opening sacral pressure decubitus, and also became even weaker, with occasional abd pain.  Pt came to the ED 4 days prior to this admission, CT showed "possible obstruction" but pt was relatively pain-free; passing stool, labs were stable, and pt was sent home on Levaquin for a possible ulcer infecftion.  The last few days, pt was starting to experience bloody stools, increased weakness, and still had difficulty maintaining appetite.  Came to office yesterday for his weekly INR check and it was noted to be >8; pt sent to the ED and INR in ED was >10.  Vit K started, Levaquin stopped.  Pt seen by GI and colonoscopy planned for whenever INR drops below 1.5 per GI's protocol.  INR was 1.51 earlier today so colonoscopy likely for tomorrow.    Pt feels better, no further bloody stools, on bowel prep with liquid diet.  Hb stable at around 10 (although a fwe months ago, last Hb was around 13).    Sacral decubitus reported stable, Burn team called again (as they were last ED visit); they will follow).    PE VSS NAD  Lungs CTA  Heart RRR s1s2  Abd +BS/NT/ND no masses  Ext no c/c/e    Labs noted  Images noted

## 2020-05-27 NOTE — CHART NOTE - NSCHARTNOTEFT_GEN_A_CORE
if the INR in the evening is less than 1.5, please prep the patient for Colonoscopy tomorrow with Golytely and Dulcolax 20mg HS. NPO after midnight

## 2020-05-27 NOTE — CONSULT NOTE ADULT - SUBJECTIVE AND OBJECTIVE BOX
[facility] INITIAL CONSULT NOTE  --------------------------------------------------------------------------------  HPI:  80 year old male with hx of HTN, HLD, CKD Iv last creatinine in office 3.3, DVT possibly provoked due to cancer ( diagnosed ~ 4 years ago, on coumadin 2.5mg daily), colon cancer s/p subtotal colectomy with iliorectal anastomosis approximately 20 years ago, right inguinal hernia repair with mesh (2009 Dr. Noyola), and anal stenosis s/p EUA with dilation (2015).  His GI is Dr. Camacho and has a colonoscopy 3 years ago showing unremarkable anastomosis, and some polyps that were removed.     Presents to the ED upon referral by his PMD (Dr. Scott) for an elevated INR on lab work this morning. He states associated abdominal pain associated with rectal bleeding. Patient states that his abdominal pain has been present for 5 days, located to his lower quadrants, crampy in nature, and non-radiating. His pain is associated with dark bloody bowel movements for the past 3 days. Patient states that he is passing flatus today, and had a bowel movement     In the ED, the patient was hypotensive with response to fluids. Hb was stable at 10.6.       PAST HISTORY  --------------------------------------------------------------------------------  PAST MEDICAL & SURGICAL HISTORY:  Colorectal tumor  CKD (chronic kidney disease)  Hypertension, unspecified type  High cholesterol  Anxiety    FAMILY HISTORY:  Negative for kidney disease    PAST SOCIAL HISTORY:  No current ETOH, tobacco, or illicit drug use    ALLERGIES & MEDICATIONS  --------------------------------------------------------------------------------  Allergies    No Known Allergies    Standing Inpatient Medications  collagenase Ointment 1 Application(s) Topical two times a day  Dakins Solution - 1/2 Strength 1 Application(s) Topical two times a day  lactated ringers. 1000 milliLiter(s) IV Continuous <Continuous>  multivitamin 1 Tablet(s) Oral daily  sertraline 100 milliGRAM(s) Oral daily    REVIEW OF SYSTEMS  --------------------------------------------------------------------------------  Gen: No fevers/chills  Skin: No rashes  Head/Eyes/Ears/Mouth: No headache;  No sinus pain/discomfort, sore throat  Respiratory: No dyspnea, cough, wheezing, hemoptysis  CV: No chest pain, PND, orthopnea  GI: No  diarrhea, constipation, nausea, vomiting  : No increased frequency, dysuria, hematuria, nocturia  All other systems were reviewed and are negative, except as noted.    VITALS/PHYSICAL EXAM  --------------------------------------------------------------------------------  T(C): 35.8 (05-27-20 @ 07:35), Max: 36.3 (05-26-20 @ 22:15)  HR: 66 (05-27-20 @ 07:35) (66 - 96)  BP: 125/56 (05-27-20 @ 07:35) (119/65 - 132/57)  RR: 20 (05-27-20 @ 07:35) (18 - 20)  SpO2: 98% (05-27-20 @ 07:35) (96% - 98%)    Physical Exam:  	Gen: NAD  	HEENT: Supple neck, clear oropharynx  	Pulm: CTA B/L  	CV: RRR, S1S2; no rub  	Back: No CVA tenderness; no sacral edema  	Abd: +BS, soft, mildly distended, nontender  	LE: Warm, no edema  	Neuro: AAO x3    LABS/STUDIES  --------------------------------------------------------------------------------              10.1   5.33  >-----------<  290      [05-27-20 @ 06:09]              30.3     134  |  97  |  64  ----------------------------<  141      [05-27-20 @ 06:09]  3.7   |  18  |  3.4        Ca     8.5     [05-27-20 @ 06:09]      Mg     2.2     [05-27-20 @ 06:09]    TPro  6.5  /  Alb  3.1  /  TBili  0.5  /  DBili  0.2  /  AST  17  /  ALT  15  /  AlkPhos  67  [05-27-20 @ 06:09]    PT/INR: PT 20.00, INR 1.74       [05-27-20 @ 06:09]  PTT: 33.0       [05-27-20 @ 06:09]    Creatinine Trend:  SCr 3.4 [05-27 @ 06:09]  SCr 3.8 [05-26 @ 12:30]  SCr 3.6 [05-22 @ 15:47]    Urinalysis - [05-22-20 @ 19:47]      Color Yellow / Appearance Clear / SG 1.018 / pH 6.0      Gluc Negative / Ketone Negative  / Bili Negative / Urobili <2 mg/dL       Blood Small / Protein 300 mg/dL / Leuk Est Negative / Nitrite Negative      RBC 1 / WBC 3 / Hyaline 7 / Gran  / Sq Epi  / Non Sq Epi 2 / Bacteria Negative

## 2020-05-27 NOTE — PROGRESS NOTE ADULT - ASSESSMENT
GI bleed  Elevated INR  - LFTs continue to be WNL  - Levaquin DC'd  - s/p Vit K, coumadin held  - for colonoscopy possibly in AM  - Also planning MRI Abd without contrast to further r/o mets    CKD  - Renal f/u appreciated; Cr holding steady  - continue to monitor    Sacral decubitus  - Burn team f/u    DM, HTN  - Continue current meds    Continue all other meds and management for current conditions

## 2020-05-27 NOTE — CONSULT NOTE ADULT - ASSESSMENT
1. CKD IV stable  2. Hematochezia - appears to have resolved  3. Elevated INR - improved  4. History of colon cancer s/p subtotal colectomy with iliorectal anastomosis approximately 20 years ago  5. Anal stenosis s/p EUA with dilation (2015)  6. ?SBO  7. HTN    Plan:  Agree with IVF  Clear liquid diet  Colonoscopy on Friday 5/29/2020  Daily BMP  Avoid NSAIDs and IV contrast

## 2020-05-27 NOTE — PROGRESS NOTE ADULT - ASSESSMENT
Lower GI Bleed (likely at the anastomotic site ulcer) while patient on Anticoagulation  history of anal stenosis  h/o colectomy in 1999 for CRC  hemodynamically stable  CBC stable  clear liquid diet  tentative C-scope and dilatation of anastomosis (stenosis) on Friday  s/p vitamin k, repeat PT/INR  non obstructed  surgery following    HTN:  controlled    ALEX on CKD 3  likely prerenal    Multiple renal cysts with renal stones  outpatient follow up  ?colectomy compication Vs ADPKD    GI PPX: Pantoprazole   DVT PPX: SCDs

## 2020-05-27 NOTE — PROGRESS NOTE ADULT - SUBJECTIVE AND OBJECTIVE BOX
SUBJECTIVE:    Patient is a 80y old Male who presents with a chief complaint of BRBPR (26 May 2020 21:58)    Currently admitted to medicine with the primary diagnosis of Rectal bleeding     Today is hospital day 1d.   bleeding     PAST MEDICAL & SURGICAL HISTORY  Colorectal tumor  CKD (chronic kidney disease)  Hypertension, unspecified type  High cholesterol  Anxiety    ALLERGIES:  No Known Allergies    MEDICATIONS:  STANDING MEDICATIONS  collagenase Ointment 1 Application(s) Topical two times a day  Dakins Solution - 1/2 Strength 1 Application(s) Topical two times a day  lactated ringers. 1000 milliLiter(s) IV Continuous <Continuous>  multivitamin 1 Tablet(s) Oral daily  sertraline 100 milliGRAM(s) Oral daily    PRN MEDICATIONS    VITALS:   T(F): 96.5  HR: 66  BP: 125/56  RR: 20  SpO2: 98%    LABS:                        10.1   5.33  )-----------( 290      ( 27 May 2020 06:09 )             30.3     05-27    134<L>  |  97<L>  |  64<HH>  ----------------------------<  141<H>  3.7   |  18  |  3.4<H>    Ca    8.5      27 May 2020 06:09  Mg     2.2     05-27    TPro  6.5  /  Alb  3.1<L>  /  TBili  0.5  /  DBili  0.2  /  AST  17  /  ALT  15  /  AlkPhos  67  05-27    PT/INR - ( 27 May 2020 06:09 )   PT: 20.00 sec;   INR: 1.74 ratio         PTT - ( 27 May 2020 06:09 )  PTT:33.0 sec      Lactate, Blood: 1.8 mmol/L (05-26-20 @ 12:30)          RADIOLOGY:    PHYSICAL EXAM:  GEN: No acute distress  LUNGS: Clear to auscultation bilaterally   HEART: Regular  ABD: Soft, non-tender, non-distended.  EXT: NC/NC/NE/2+PP/BENAVIDEZ/Skin Intact.   NEURO: AAOX3

## 2020-05-28 LAB
BASOPHILS # BLD AUTO: 0.01 K/UL — SIGNIFICANT CHANGE UP (ref 0–0.2)
BASOPHILS NFR BLD AUTO: 0.1 % — SIGNIFICANT CHANGE UP (ref 0–1)
EOSINOPHIL # BLD AUTO: 0.03 K/UL — SIGNIFICANT CHANGE UP (ref 0–0.7)
EOSINOPHIL NFR BLD AUTO: 0.4 % — SIGNIFICANT CHANGE UP (ref 0–8)
HCT VFR BLD CALC: 31.6 % — LOW (ref 42–52)
HGB BLD-MCNC: 10.4 G/DL — LOW (ref 14–18)
IMM GRANULOCYTES NFR BLD AUTO: 3.9 % — HIGH (ref 0.1–0.3)
LYMPHOCYTES # BLD AUTO: 0.72 K/UL — LOW (ref 1.2–3.4)
LYMPHOCYTES # BLD AUTO: 9.1 % — LOW (ref 20.5–51.1)
MCHC RBC-ENTMCNC: 27.6 PG — SIGNIFICANT CHANGE UP (ref 27–31)
MCHC RBC-ENTMCNC: 32.9 G/DL — SIGNIFICANT CHANGE UP (ref 32–37)
MCV RBC AUTO: 83.8 FL — SIGNIFICANT CHANGE UP (ref 80–94)
MONOCYTES # BLD AUTO: 0.89 K/UL — HIGH (ref 0.1–0.6)
MONOCYTES NFR BLD AUTO: 11.2 % — HIGH (ref 1.7–9.3)
NEUTROPHILS # BLD AUTO: 5.96 K/UL — SIGNIFICANT CHANGE UP (ref 1.4–6.5)
NEUTROPHILS NFR BLD AUTO: 75.3 % — HIGH (ref 42.2–75.2)
NRBC # BLD: 0 /100 WBCS — SIGNIFICANT CHANGE UP (ref 0–0)
PLATELET # BLD AUTO: 297 K/UL — SIGNIFICANT CHANGE UP (ref 130–400)
RBC # BLD: 3.77 M/UL — LOW (ref 4.7–6.1)
RBC # FLD: 14.6 % — HIGH (ref 11.5–14.5)
WBC # BLD: 7.92 K/UL — SIGNIFICANT CHANGE UP (ref 4.8–10.8)
WBC # FLD AUTO: 7.92 K/UL — SIGNIFICANT CHANGE UP (ref 4.8–10.8)

## 2020-05-28 PROCEDURE — 99232 SBSQ HOSP IP/OBS MODERATE 35: CPT

## 2020-05-28 PROCEDURE — 99231 SBSQ HOSP IP/OBS SF/LOW 25: CPT

## 2020-05-28 RX ORDER — HEPARIN SODIUM 5000 [USP'U]/ML
5000 INJECTION INTRAVENOUS; SUBCUTANEOUS EVERY 8 HOURS
Refills: 0 | Status: DISCONTINUED | OUTPATIENT
Start: 2020-05-28 | End: 2020-05-29

## 2020-05-28 RX ADMIN — Medication 1 APPLICATION(S): at 06:15

## 2020-05-28 RX ADMIN — Medication 1 APPLICATION(S): at 18:30

## 2020-05-28 RX ADMIN — HEPARIN SODIUM 5000 UNIT(S): 5000 INJECTION INTRAVENOUS; SUBCUTANEOUS at 21:45

## 2020-05-28 RX ADMIN — Medication 1 TABLET(S): at 11:19

## 2020-05-28 RX ADMIN — SERTRALINE 100 MILLIGRAM(S): 25 TABLET, FILM COATED ORAL at 11:19

## 2020-05-28 NOTE — CONSULT NOTE ADULT - SUBJECTIVE AND OBJECTIVE BOX
VASCULAR SURGERY CONSULT NOTE      HPI:  [80 year old man]    CC: BRBPR     80 year old male with hx of HTN, HLD, CKD 3, DVT possibly provoked due to cancer ( diagnosed ~ 4 years ago, on coumadin 2.5mg daily), colon cancer s/p subtotal colectomy with iliorectal anastomosis approximately 20 years ago, right inguinal hernia repair with mesh (2009 Dr. Noyola), and anal stenosis s/p EUA with dilation (2015).  His GI is Dr. Camacho and has a colonoscopy 3 years ago showing unremarkable anastomosis, and some polyps that were removed.     Presents to the ED upon referral by his PMD (Dr. Scott) for an elevated INR on lab work this morning. He states associated abdominal pain associated with rectal bleeding. Patient states that his abdominal pain has been present for 5 days, located to his lower quadrants, crampy in nature, and non-radiating. His pain is associated with dark bloody bowel movements for the past 3 days. He denies any fevers, chills, chest pain, sob, nausea, vomiting, hemoptysis, hematuria. Patient states that he is passing flatus today, and had a bowel movement     In the ED, the patient was hypotensive with response to fluids. Hb was stable at 10.6. (26 May 2020 21:58)        PAST MEDICAL & SURGICAL HISTORY:  Colorectal tumor  CKD (chronic kidney disease)  Hypertension, unspecified type  High cholesterol  Anxiety    No Known Allergies    Home Medications:  Actos 15 mg oral tablet: 1 tab(s) orally once a day (26 May 2020 22:30)  amLODIPine 5 mg oral tablet: 1 tab(s) orally once a day (26 May 2020 22:30)  Coumadin 2 mg oral tablet: 1 tab(s) orally once a day (alternates 0.5 and 1 tablet daily)  (26 May 2020 22:30)  sertraline 100 mg oral tablet: 1 tab(s) orally once a day (26 May 2020 22:30)    No permtinent family history of PVD    REVIEW OF SYSTEMS:  GENERAL:                                         negative  SKIN:                                                 negative  OPTHALMOLOGIC:                          negative  ENMT:                                               negative  RESPIRATORY AND THORAX:        negative  CARDIOVASCULAR:                         negative  GASTROINTESTINAL:                       see HPI  NEPHROLOGY:                                  negative  MUSCULOSKELETAL:                       negative  NEUROLOGIC:                                   negative  PSYCHIATRIC:                                    negative  HEMATOLOGY/LYMPHATICS:         negative  ENDOCRINE:                                     see HPI  ALLERGIC/IMMUNOLOGIC:            negative    12 point ROS otherwise normal except as stated in HPI    PHYSICAL EXAM  Vital Signs Last 24 Hrs  T(C): 36.5 (28 May 2020 07:49), Max: 36.6 (27 May 2020 16:08)  T(F): 97.7 (28 May 2020 07:49), Max: 97.8 (27 May 2020 16:08)  HR: 62 (28 May 2020 07:49) (62 - 82)  BP: 131/55 (28 May 2020 12:31) (118/68 - 145/66)  BP(mean): --  RR: 20 (28 May 2020 12:29) (18 - 20)  SpO2: 94% (28 May 2020 12:29) (94% - 99%)    Appearance: Normal	  HEENT:   Normal oral mucosa, PERRL, EOMI	  Neck: Supple, - JVD;   Cardiovascular: Normal S1 S2, No JVD, No murmurs,   Respiratory: Lungs clear to auscultation, No Rales, Rhonchi, Wheezing	  Gastrointestinal:  Soft, Non-tender, positive BS	  Skin: No rashes, No ecchymoses, No cyanosis  Extremities: Normal range of motion, No clubbing, cyanosis or edema  Neurologic: Non-focal  Psychiatry: A & O x 3, Mood & affect appropriate      MEDICATIONS:   MEDICATIONS  (STANDING):  collagenase Ointment 1 Application(s) Topical two times a day  Dakins Solution - 1/2 Strength 1 Application(s) Topical two times a day  multivitamin 1 Tablet(s) Oral daily  sertraline 100 milliGRAM(s) Oral daily    MEDICATIONS  (PRN):      LAB/STUDIES:                        10.4   7.92  )-----------( 297      ( 28 May 2020 05:27 )             31.6     05-27    134<L>  |  97<L>  |  64<HH>  ----------------------------<  141<H>  3.7   |  18  |  3.4<H>    Ca    8.5      27 May 2020 06:09  Mg     2.2     05-27    TPro  6.5  /  Alb  3.1<L>  /  TBili  0.5  /  DBili  0.2  /  AST  17  /  ALT  15  /  AlkPhos  67  05-27    PT/INR - ( 27 May 2020 11:20 )   PT: 17.40 sec;   INR: 1.51 ratio         PTT - ( 27 May 2020 06:09 )  PTT:33.0 sec  LIVER FUNCTIONS - ( 27 May 2020 06:09 )  Alb: 3.1 g/dL / Pro: 6.5 g/dL / ALK PHOS: 67 U/L / ALT: 15 U/L / AST: 17 U/L / GGT: x               IMAGING:  < from: MR Abdomen w/ IV Cont (05.27.20 @ 19:54) >  Suspicion of a descending thoracic aortic dissection. The thoracic aorta is incompletely imaged.     Severely limited in evaluation due to motion artifact. No obvious large liver lesion. If there remains a high clinical suspicion, repeat outpatient MRI is recommended.     Dilated bowel is better evaluated on recent CT

## 2020-05-28 NOTE — CONSULT NOTE ADULT - CONSULT REQUESTED BY NAME
ED
Medicine
Medicine
Sonia
ED
few scattered erythematous patchy areas on bilateral  arms and face with punctate central lesions with no underlying fluctuation or induration./PATCHY AREAS

## 2020-05-28 NOTE — PROGRESS NOTE ADULT - SUBJECTIVE AND OBJECTIVE BOX
GENERAL SURGERY PROGRESS NOTE     MARIELENA REYES  80y  Male  Hospital day :2d  OVERNIGHT EVENTS: no acute overnight events     T(F): 97.7 (05-28-20 @ 07:49), Max: 97.8 (05-27-20 @ 16:08)  HR: 62 (05-28-20 @ 07:49) (62 - 82)  BP: 119/62 (05-28-20 @ 07:49) (118/68 - 134/60)  RR: 18 (05-28-20 @ 07:49) (18 - 20)  SpO2: 98% (05-28-20 @ 07:49) (98% - 99%)        PHYSICAL EXAM:  GENERAL: NAD, well-appearing  HEART: Regular rate and rhythm  ABDOMEN: Soft, Nontender, Nondistended;         LABS  Labs:  CAPILLARY BLOOD GLUCOSE                              10.4   7.92  )-----------( 297      ( 28 May 2020 05:27 )             31.6       Auto Immature Granulocyte %: 3.9 % (05-28-20 @ 05:27)  Auto Neutrophil %: 75.3 % (05-28-20 @ 05:27)    05-27    134<L>  |  97<L>  |  64<HH>  ----------------------------<  141<H>  3.7   |  18  |  3.4<H>          LFTs:             6.5  | 0.5  | 17       ------------------[67      ( 27 May 2020 06:09 )  3.1  | 0.2  | 15          Lipase:x      Amylase:x         Lactate, Blood: 1.8 mmol/L (05-26-20 @ 12:30)      Coags:     17.40  ----< 1.51    ( 27 May 2020 11:20 )     x                           RADIOLOGY & ADDITIONAL TESTS:  Status post colectomy. There is mild narrowing at the ileorectal   anastomosis with dilatation of the bowel proximally. Findings are again   compatible with partial obstruction in the region of the anastomosis..     No evidence of ascites or free air within the abdomen or pelvis.

## 2020-05-28 NOTE — PROGRESS NOTE ADULT - SUBJECTIVE AND OBJECTIVE BOX
Pt stable, no complaints.  No pain noted.    MRI shows no specific evidence of mets, as well as a possible suggestion of a descending aortic aneurysm.  Pt is hemodynamically stable, however, and has had no pain.    Renal function remaining stable but precludes further imaging workup due to inability to use contrast.    Colonoscopy delayed due to delay in receiving COVID-19 test results.  Pt taking in oral prep.    PE VSS NAD  Lungs CTA  Heart RRR s1s2  Abd benign  Ext no c/c/e    Labs noted incl INR 1.51    MRI noted    COVID19 pending

## 2020-05-28 NOTE — PROGRESS NOTE ADULT - SUBJECTIVE AND OBJECTIVE BOX
[place] [facility] FOLLOW UP NOTE  --------------------------------------------------------------------------------  24 hour events/subjective: Patient examined. Appears comfortable.    PAST HISTORY  --------------------------------------------------------------------------------  No significant changes to PMH, PSH, FHx, SHx, unless otherwise noted    ALLERGIES & MEDICATIONS  --------------------------------------------------------------------------------  Allergies    No Known Allergies    Standing Inpatient Medications  collagenase Ointment 1 Application(s) Topical two times a day  Dakins Solution - 1/2 Strength 1 Application(s) Topical two times a day  multivitamin 1 Tablet(s) Oral daily  sertraline 100 milliGRAM(s) Oral daily    VITALS/PHYSICAL EXAM  --------------------------------------------------------------------------------  T(C): 36.5 (05-28-20 @ 07:49), Max: 36.6 (05-27-20 @ 16:08)  HR: 62 (05-28-20 @ 07:49) (62 - 82)  BP: 131/55 (05-28-20 @ 12:31) (118/68 - 145/66)  RR: 20 (05-28-20 @ 12:29) (18 - 20)  SpO2: 94% (05-28-20 @ 12:29) (94% - 99%)  Wt(kg): --        Physical Exam:  	Gen: NAD  	Pulm: CTA B/L  	CV: RRR, S1S2  	Abd: +BS, soft, nontender/nondistended  	: No suprapubic tenderness  	LE: Warm, no edema    LABS/STUDIES  --------------------------------------------------------------------------------              10.4   7.92  >-----------<  297      [05-28-20 @ 05:27]              31.6     134  |  97  |  64  ----------------------------<  141      [05-27-20 @ 06:09]  3.7   |  18  |  3.4        Ca     8.5     [05-27-20 @ 06:09]      Mg     2.2     [05-27-20 @ 06:09]    TPro  6.5  /  Alb  3.1  /  TBili  0.5  /  DBili  0.2  /  AST  17  /  ALT  15  /  AlkPhos  67  [05-27-20 @ 06:09]    PT/INR: PT 17.40, INR 1.51       [05-27-20 @ 11:20]  PTT: 33.0       [05-27-20 @ 06:09]    Creatinine Trend:  SCr 3.4 [05-27 @ 06:09]  SCr 3.8 [05-26 @ 12:30]  SCr 3.6 [05-22 @ 15:47]    Urinalysis - [05-22-20 @ 19:47]      Color Yellow / Appearance Clear / SG 1.018 / pH 6.0      Gluc Negative / Ketone Negative  / Bili Negative / Urobili <2 mg/dL       Blood Small / Protein 300 mg/dL / Leuk Est Negative / Nitrite Negative      RBC 1 / WBC 3 / Hyaline 7 / Gran  / Sq Epi  / Non Sq Epi 2 / Bacteria Negative

## 2020-05-28 NOTE — CONSULT NOTE ADULT - SUBJECTIVE AND OBJECTIVE BOX
80 year old male with hx of HTN, HLD, CKD 3, DVT possibly provoked due to cancer. Pt was found to have Stage 3 pressure ulcers to B/L buttocks for the past few months. Burn team was consulted for evaluation and treatment for B/L buttock wound. Pt seen at bedside today, wound dressings were changed.         PHYSICAL EXAM:  GENERAL: well built, well nourished  HEAD:  Atraumatic, Normocephalic  SKIN: No rashes or lesions  Wound:   Left Buttock: [id number] stage 3 pressure ulcer to left buttock. Wound pink and moist with some small area of black necrotic tissue with hypertrophic skin around wound borders. no edema or mucopurulent drainage, or foul odor noted.   Right Buttock: Left Buttock: [id number] stage 3 pressure ulcer to left buttock. Wound pink and moist with some small area of black necrotic tissue with hypertrophic skin around wound borders. no edema or mucopurulent drainage, or foul odor noted. 80 year old male with hx of HTN, HLD, CKD 3, DVT possibly provoked due to cancer. Pt was found to have Stage 3 pressure ulcers to B/L buttocks for the past few months. Burn team was consulted for evaluation and treatment for B/L buttock wound. Pt seen at bedside today, wound dressings were changed.         PHYSICAL EXAM:  GENERAL: well built, well nourished  HEAD:  Atraumatic, Normocephalic  SKIN: No rashes or lesions  Wound:   Left Buttock: [id number] stage 3 pressure ulcer to left buttock. Wound pink and moist with some small areas of black necrotic tissue with hypertrophic skin around wound borders. no edema or mucopurulent drainage, or foul odor noted.   Right Buttock: Left Buttock: [id number] stage 3 pressure ulcer to left buttock. Wound pink and moist with some small areas of black necrotic tissue with hypertrophic skin around wound borders. no edema or mucopurulent drainage, or foul odor noted. 80 year old male with hx of HTN, HLD, CKD 3, DVT possibly provoked due to cancer. Pt reports he has had ulcers for > 1 year. was referred to Dr Casey at some point.    Burn team was consulted for evaluation and treatment for B/L buttock wound. Pt seen at bedside today, wound dressings were changed.         PHYSICAL EXAM:  GENERAL: well built, well nourished  HEAD:  Atraumatic, Normocephalic  SKIN: No rashes or lesions  Wound:   Left Buttock: 6 x 4 x 1cm open wound  with irregular raised border and chronic granulation   small area of black necrotic skin    no edema or mucopurulent drainage, or foul odor noted.     Right Buttock: 5 x 3 x 1 cm  ulcer with similar findings ; no necrotic tissue     dressing/ packing placed

## 2020-05-28 NOTE — PROGRESS NOTE ADULT - ASSESSMENT
80M with multiple medical comorbidities including HTN, HLD, CKD, DVT (on coumadin) presents with lower GI bleeding and supratherapeutic INR. PSH OF total colectomy with ileorectal anastomosis for cancer in 1999 with no chemo or radio & multiple episodes of anal stenosis requiring surgical intervention. His last colonoscopy 3 years ago showing unremarkable anastomosis, and some polyps that were removed.  Patient seen and examined.  He reports mild lower abdominal pain and a brown BM this morning.  He states he has multiple loose stools daily    PLAN:  - Correct INR  - trend Hgb  - transfuse as appropriate  - GI for endoscopic evaluation tmr   - colorectal surgery to follow .

## 2020-05-28 NOTE — PROGRESS NOTE ADULT - SUBJECTIVE AND OBJECTIVE BOX
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  can start on anticoagulation as per vascular     ALEX on CKD 3  likely prerenal   repeat BMP    Multiple renal cysts with renal stones  outpatient follow up  ?colectomy complication Vs ADPKD    GI PPX: Pantoprazole   DVT PPX: On therapeutic anticoagulation as per attending since Hb stable and no active bleed.  Lovenox for now

## 2020-05-28 NOTE — CONSULT NOTE ADULT - ASSESSMENT
80 year old male with hx of HTN, HLD, CKD 3, Burn team consulted for Evaluation of wounds to B/L buttock.   - no recommendation for surgery at this time   - LWC: pack with Dakins moist Kerlix, cover with ABD Pad twice daily.   - Will follow. 80 year old male with hx of HTN, HLD, CKD 3, with chronic buttock wounds    - no recommendation for surgery at this time   - LWC: pack with Dakins moist Kerlix to open areas only ; Hydrogel to necrotic area left buttock , cover with ABD Pad twice daily.   - Will follow.

## 2020-05-28 NOTE — PROGRESS NOTE ADULT - ASSESSMENT
1. CKD IV stable  2. Hematochezia - appears to have resolved  3. Elevated INR - improved  4. History of colon cancer s/p subtotal colectomy with iliorectal anastomosis approximately 20 years ago  5. Anal stenosis s/p EUA with dilation (2015)  6. ?SBO  7. HTN    Plan:  Agree with IVF  Clear liquid diet  Colonoscopy on Friday   Daily BMP  Avoid NSAIDs and IV contrast

## 2020-05-28 NOTE — PROGRESS NOTE ADULT - ASSESSMENT
GI bleed  Elevated INR  - LFTs continue to be WNL  - s/p Vit K, coumadin held - on heparin at DVT prophylaxis levels  - for colonoscopy possibly in AM if COVID19 results return; pt taking prep  - Also planning MRI Abd without contrast to further r/o mets    CKD  - Renal f/u appreciated; Cr holding steady  - continue to monitor    Sacral decubitus  - Burn team f/u appreciated    ? Aortic dissection on non-contrast MRI  - pt stable at present.  Vascular f/u appreciated.  Will observe for now as dissection cannot be confirmed    DM, HTN  - Continue current meds    Continue all other meds and management for current conditions

## 2020-05-28 NOTE — CONSULT NOTE ADULT - ASSESSMENT
80 year old male with hx of HTN, HLD, CKD 3, DVT possibly provoked due to cancer ( diagnosed ~ 4 years ago, on coumadin 2.5mg daily), colon cancer s/p subtotal colectomy with iliorectal anastomosis approximately 20 years ago, right inguinal hernia repair with mesh (2009 Dr. Noyola), and anal stenosis s/p EUA with dilation (2015).  His GI is Dr. Camacho and has a colonoscopy 3 years ago showing unremarkable anastomosis, and some polyps that were removed.  Presents to the ED upon referral by his PMD (Dr. Scott) for an elevated INR on lab work this morning. He states associated abdominal pain associated with rectal bleeding.    Vascular surgery called for the incidental finding of  descending thoracic aortic dissection on MRI w IV contrast  Pt is asymptomatic    Plan:   - BP control  - may follow up as outpt for close monitoring  SPECTRA [id number]

## 2020-05-29 ENCOUNTER — TRANSCRIPTION ENCOUNTER (OUTPATIENT)
Age: 80
End: 2020-05-29

## 2020-05-29 ENCOUNTER — RESULT REVIEW (OUTPATIENT)
Age: 80
End: 2020-05-29

## 2020-05-29 VITALS
RESPIRATION RATE: 20 BRPM | HEART RATE: 80 BPM | TEMPERATURE: 96 F | OXYGEN SATURATION: 99 % | SYSTOLIC BLOOD PRESSURE: 134 MMHG | DIASTOLIC BLOOD PRESSURE: 52 MMHG

## 2020-05-29 LAB
ALBUMIN SERPL ELPH-MCNC: 3.4 G/DL — LOW (ref 3.5–5.2)
ALP SERPL-CCNC: 67 U/L — SIGNIFICANT CHANGE UP (ref 30–115)
ALT FLD-CCNC: 13 U/L — SIGNIFICANT CHANGE UP (ref 0–41)
ANION GAP SERPL CALC-SCNC: 18 MMOL/L — HIGH (ref 7–14)
AST SERPL-CCNC: 17 U/L — SIGNIFICANT CHANGE UP (ref 0–41)
BASOPHILS # BLD AUTO: 0.06 K/UL — SIGNIFICANT CHANGE UP (ref 0–0.2)
BASOPHILS NFR BLD AUTO: 0.8 % — SIGNIFICANT CHANGE UP (ref 0–1)
BILIRUB SERPL-MCNC: 0.3 MG/DL — SIGNIFICANT CHANGE UP (ref 0.2–1.2)
BUN SERPL-MCNC: 42 MG/DL — HIGH (ref 10–20)
CALCIUM SERPL-MCNC: 9.2 MG/DL — SIGNIFICANT CHANGE UP (ref 8.5–10.1)
CHLORIDE SERPL-SCNC: 101 MMOL/L — SIGNIFICANT CHANGE UP (ref 98–110)
CO2 SERPL-SCNC: 22 MMOL/L — SIGNIFICANT CHANGE UP (ref 17–32)
CREAT SERPL-MCNC: 2.7 MG/DL — HIGH (ref 0.7–1.5)
EOSINOPHIL # BLD AUTO: 0.08 K/UL — SIGNIFICANT CHANGE UP (ref 0–0.7)
EOSINOPHIL NFR BLD AUTO: 1.1 % — SIGNIFICANT CHANGE UP (ref 0–8)
GLUCOSE BLDC GLUCOMTR-MCNC: 117 MG/DL — HIGH (ref 70–99)
GLUCOSE SERPL-MCNC: 118 MG/DL — HIGH (ref 70–99)
HCT VFR BLD CALC: 32.3 % — LOW (ref 42–52)
HGB BLD-MCNC: 10.6 G/DL — LOW (ref 14–18)
IMM GRANULOCYTES NFR BLD AUTO: 5.9 % — HIGH (ref 0.1–0.3)
INR BLD: 1.63 RATIO — HIGH (ref 0.65–1.3)
LYMPHOCYTES # BLD AUTO: 1.3 K/UL — SIGNIFICANT CHANGE UP (ref 1.2–3.4)
LYMPHOCYTES # BLD AUTO: 17.9 % — LOW (ref 20.5–51.1)
MCHC RBC-ENTMCNC: 27.7 PG — SIGNIFICANT CHANGE UP (ref 27–31)
MCHC RBC-ENTMCNC: 32.8 G/DL — SIGNIFICANT CHANGE UP (ref 32–37)
MCV RBC AUTO: 84.3 FL — SIGNIFICANT CHANGE UP (ref 80–94)
MONOCYTES # BLD AUTO: 0.67 K/UL — HIGH (ref 0.1–0.6)
MONOCYTES NFR BLD AUTO: 9.2 % — SIGNIFICANT CHANGE UP (ref 1.7–9.3)
NEUTROPHILS # BLD AUTO: 4.74 K/UL — SIGNIFICANT CHANGE UP (ref 1.4–6.5)
NEUTROPHILS NFR BLD AUTO: 65.1 % — SIGNIFICANT CHANGE UP (ref 42.2–75.2)
NRBC # BLD: 0 /100 WBCS — SIGNIFICANT CHANGE UP (ref 0–0)
PLATELET # BLD AUTO: 401 K/UL — HIGH (ref 130–400)
POTASSIUM SERPL-MCNC: 4.3 MMOL/L — SIGNIFICANT CHANGE UP (ref 3.5–5)
POTASSIUM SERPL-SCNC: 4.3 MMOL/L — SIGNIFICANT CHANGE UP (ref 3.5–5)
PROT SERPL-MCNC: 7.1 G/DL — SIGNIFICANT CHANGE UP (ref 6–8)
PROTHROM AB SERPL-ACNC: 18.8 SEC — HIGH (ref 9.95–12.87)
RBC # BLD: 3.83 M/UL — LOW (ref 4.7–6.1)
RBC # FLD: 15 % — HIGH (ref 11.5–14.5)
SARS-COV-2 RNA SPEC QL NAA+PROBE: SIGNIFICANT CHANGE UP
SODIUM SERPL-SCNC: 141 MMOL/L — SIGNIFICANT CHANGE UP (ref 135–146)
WBC # BLD: 7.28 K/UL — SIGNIFICANT CHANGE UP (ref 4.8–10.8)
WBC # FLD AUTO: 7.28 K/UL — SIGNIFICANT CHANGE UP (ref 4.8–10.8)

## 2020-05-29 PROCEDURE — 99231 SBSQ HOSP IP/OBS SF/LOW 25: CPT

## 2020-05-29 PROCEDURE — 88305 TISSUE EXAM BY PATHOLOGIST: CPT | Mod: 26

## 2020-05-29 PROCEDURE — 45380 COLONOSCOPY AND BIOPSY: CPT

## 2020-05-29 RX ORDER — MESALAMINE 400 MG
1 TABLET, DELAYED RELEASE (ENTERIC COATED) ORAL
Qty: 30 | Refills: 0
Start: 2020-05-29 | End: 2020-06-27

## 2020-05-29 RX ORDER — MESALAMINE 400 MG
1 TABLET, DELAYED RELEASE (ENTERIC COATED) ORAL
Qty: 60 | Refills: 0
Start: 2020-05-29 | End: 2020-06-27

## 2020-05-29 RX ADMIN — HEPARIN SODIUM 5000 UNIT(S): 5000 INJECTION INTRAVENOUS; SUBCUTANEOUS at 05:54

## 2020-05-29 RX ADMIN — Medication 1 TABLET(S): at 13:53

## 2020-05-29 RX ADMIN — HEPARIN SODIUM 5000 UNIT(S): 5000 INJECTION INTRAVENOUS; SUBCUTANEOUS at 13:53

## 2020-05-29 RX ADMIN — Medication 1 APPLICATION(S): at 05:54

## 2020-05-29 RX ADMIN — Medication 1 APPLICATION(S): at 05:53

## 2020-05-29 NOTE — DISCHARGE NOTE PROVIDER - NSDCCPCAREPLAN_GEN_ALL_CORE_FT
PRINCIPAL DISCHARGE DIAGNOSIS  Diagnosis: Rectal bleeding  Assessment and Plan of Treatment: Please follow up with your primary outpatient physician (Dr. Scott) and with your gastroenterologist (Dr. Peres) within 2-4 weeks of discharge. Please take your medications, including the suppository and enema, as directed, and follow a lactose-free and gluten-free diet. If you develop worsening bleeding, dizziness or lightheadedness, abdominal pain, or any other symptoms or signs that alarm you, please seek immediate medical attention.      SECONDARY DISCHARGE DIAGNOSES  Diagnosis: Descending thoracic aortic dissection  Assessment and Plan of Treatment: Please follow up with Dr. Baires upon discharge from the hospital.    Diagnosis: Chronic kidney disease (CKD)  Assessment and Plan of Treatment: Please take your medications as prescribed and follow up with your primary outpatient physician upon discharge from the hospital.

## 2020-05-29 NOTE — DISCHARGE NOTE PROVIDER - CARE PROVIDERS DIRECT ADDRESSES
,DirectAddress_Unknown,DirectAddress_Unknown,[email or web address]

## 2020-05-29 NOTE — PROGRESS NOTE ADULT - ATTENDING COMMENTS
I am the attg for this pt today and my notes are above.
I am the attg for this pt today and my notes are above.
80M with multiple medical comorbidities including HTN, HLD, CKD, DVT (on coumadin) presents with lower GI bleeding and supratherapeutic INR.  He states he had a total colectomy with ileorectal anastomosis for cancer in 1999.  He denies chemotherapy or radiation.  He has had multiple episodes of anal stenosis requiring surgical intervention. His GI is Dr. Camacho and has a colonoscopy 3 years ago showing unremarkable anastomosis, and some polyps that were removed. We do not have that report.     Patient seen and examined.  He no longer reports abdominal pain.  He is taking his prep and his having light brown liquid stool.  He has not had any bleeding in last 24 hours.    labs and images reviewed    CTAP - Status post colectomy. There is mild narrowing at the ileorectal anastomosis with dilatation of the bowel proximally. Findings are again compatible with partial obstruction in the region of the anastomosis.    on my evaluation of the CT scan there appears to be a J pouch given the location of the staple lines however this is not mentioned in any of his documentation.    Hgb 10.4  INR 1.5    PLAN:  - trend Hgb  - transfuse as appropriate  - GI for endoscopic evaluation  - colorectal surgery to follow
80M with multiple medical comorbidities including HTN, HLD, CKD, DVT (on coumadin) presents with lower GI bleeding and supratherapeutic INR.  He states he had a total colectomy with ileorectal anastomosis for cancer in 1999.  He denies chemotherapy or radiation.  He has had multiple episodes of anal stenosis requiring surgical intervention. His GI is Dr. Camacho and has a colonoscopy 3 years ago showing unremarkable anastomosis, and some polyps that were removed. We do not have that report.     Patient seen and examined.  He no longer reports abdominal pain.  States he has not had additional bloody BM.     labs and images reviewed    CTAP - Status post colectomy. There is mild narrowing at the ileorectal anastomosis with dilatation of the bowel proximally. Findings are again compatible with partial obstruction in the region of the anastomosis.    on my evaluation of the CT scan there appears to be a J pouch given the location of the staple lines however this is not mentioned in any of his documentation.    PLAN:  - trend Hgb  - transfuse as appropriate  - GI for endoscopic evaluation today  - colorectal surgery to follow

## 2020-05-29 NOTE — DISCHARGE NOTE NURSING/CASE MANAGEMENT/SOCIAL WORK - PATIENT PORTAL LINK FT
You can access the FollowMyHealth Patient Portal offered by [facility] by registering at the following website: [email or web address]. By joining [employer]’s FollowMyHealth portal, you will also be able to view your health information using other applications (apps) compatible with our system.

## 2020-05-29 NOTE — PROGRESS NOTE ADULT - ASSESSMENT
80M with multiple medical comorbidities including HTN, HLD, CKD, DVT (on coumadin) presents with lower GI bleeding and supratherapeutic INR. PSH OF total colectomy with ileorectal anastomosis for cancer in 1999 with no chemo or radio & multiple episodes of anal stenosis requiring surgical intervention. His last colonoscopy 3 years ago showing unremarkable anastomosis, and some polyps that were removed. Normal brown BM, Hgb has been stable.    PLAN:  - trend Hgb  - transfuse as appropriate  - GI for endoscopic evaluation , pending COVID  - colorectal surgery to follow .

## 2020-05-29 NOTE — PROGRESS NOTE ADULT - SUBJECTIVE AND OBJECTIVE BOX
GENERAL SURGERY PROGRESS NOTE     MARIELENA REYES  80y  Male  Hospital day :3d     OVERNIGHT EVENTS: no acute events overnight , pending repeat hgb stable so far, normal BM    T(F): 98.2 (05-28-20 @ 23:46), Max: 98.2 (05-28-20 @ 23:46)  HR: 66 (05-28-20 @ 23:46) (62 - 72)  BP: 126/63 (05-28-20 @ 23:46) (119/62 - 145/66)  RR: 18 (05-28-20 @ 23:46) (18 - 20)  SpO2: 99% (05-28-20 @ 23:46) (94% - 99%)    DIET/FLUIDS: multivitamin 1 Tablet(s) Oral daily    AC/ proph: heparin   Injectable 5000 Unit(s) SubCutaneous every 8 hours    PHYSICAL EXAM:  GENERAL: NAD, well-appearing  CHEST/LUNG: Clear to auscultation bilaterally  HEART: Regular rate and rhythm  ABDOMEN: Soft, Nontender, Nondistended;     LABS  CAPILLARY BLOOD GLUCOSE                          10.4   7.92  )-----------( 297      ( 28 May 2020 05:27 )             31.6       Auto Immature Granulocyte %: 3.9 % (05-28-20 @ 05:27)  Auto Neutrophil %: 75.3 % (05-28-20 @ 05:27)    05-27    134<L>  |  97<L>  |  64<HH>  ----------------------------<  141<H>  3.7   |  18  |  3.4<H>          LFTs:             6.5  | 0.5  | 17       ------------------[67      ( 27 May 2020 06:09 )  3.1  | 0.2  | 15          Lipase:x      Amylase:x         Lactate, Blood: 1.8 mmol/L (05-26-20 @ 12:30)      Coags:     17.40  ----< 1.51    ( 27 May 2020 11:20 )     x

## 2020-05-29 NOTE — PROGRESS NOTE ADULT - ASSESSMENT
1. CKD IV stable  2. Hematochezia - appears to have resolved  3. Elevated INR - improved  4. History of colon cancer s/p subtotal colectomy with iliorectal anastomosis approximately 20 years ago  5. Anal stenosis s/p EUA with dilation (2015)  6. ?SBO  7. HTN    Plan:    GI F/U for colonoscopy   Daily BMP  Avoid NSAIDs and IV contrast

## 2020-05-29 NOTE — DISCHARGE NOTE PROVIDER - HOSPITAL COURSE
80 year old male with hx of HTN, HLD, CKD 3, DVT possibly provoked due to cancer (diagnosed ~ 4 years ago, on coumadin 2.5mg daily), colon cancer s/p subtotal colectomy with iliorectal anastomosis approximately 20 years ago, right inguinal hernia repair with mesh (2009 Dr. Noyola), and anal stenosis s/p EUA with dilation (2015).  His GI is Dr. Camacho and has a colonoscopy 3 years ago showing unremarkable anastomosis, and some polyps that were removed. He presented to the ED upon referral by his PMD (Dr. Scott) for an elevated INR on lab work the morning of presentation. He stated associated abdominal pain associated with rectal bleeding. Patient states that his abdominal pain had been  present for 5 days, located to his lower quadrants, crampy in nature, and non-radiating. His pain was associated with dark bloody bowel movements for 3 days prior to presentation. He denies any fevers, chills, chest pain, sob, nausea, vomiting, hemoptysis, hematuria. An MRI Abdomen w/ IV contrast revealed the following:        Suspicion of a descending thoracic aortic dissection. The thoracic aorta is incompletely imaged.     Severely limited in evaluation due to motion artifact. No obvious large liver lesion. If there remains a high clinical suspicion, repeat outpatient MRI is recommended.     Dilated bowel is better evaluated on recent CT.        A colonoscopy on 5/29 revealed the following:        < from: Colonoscopy (05.29.20 @ 10:30) >         -Post surgical Anatomy. Post total colectomy with ileal pouch anal anastomosis.    .      -On rectal exam anal stricture noted but finger able to be passed. Also large    sacral ulcer noted. .      -PCF scope able to be passed through anus and advanced up to 40 cm from anal    orifice. Small bowel and ileal pouch anastomosis noted at 15 cm from anal    orifice. Anus and ileal pouch anastomosis noted at 5 cmfrom anal orifice. .      -In the anus lumen appeared narrowed but scope able to be traversed and mucosa    is erythematous, edematous with ulcerations noted. .      -In the illeal pouch mucosa appeared diffuse patchy erythematous edematous with    deep cratered exudative ulcerations. Multiple biopsies obtained. .      -In the small bowel ileal pouch anastomotic site mucosa appeared diffuse    erythematous edematous with deep cratered exudative ulcerations. Multiple    biopsies obtained. .      --In the small bowel scope advanced up to 40 cm from anal orifice through out    mucosa appeared diffuse patchy erythematous edematous with deep cratered    exudative ulcerations. Multiple biopsies obtained. .      These findings raise suspicion for possible chron's disease.         He was recommended to start a lactose-free gluten-free diet and to start a Canasa suppository nightly and a Rowasa enema twice daily, to follow up with Dr. Peres (GI) within 2 to 4 weeks following discharge from the hospital. He was instructed to follow up with Dr. Scott on Tuesday to monitor his INR.

## 2020-05-29 NOTE — DISCHARGE NOTE PROVIDER - NSDCMRMEDTOKEN_GEN_ALL_CORE_FT
Actos 15 mg oral tablet: 1 tab(s) orally once a day  amLODIPine 5 mg oral tablet: 1 tab(s) orally once a day  Canasa 1000 mg rectal suppository: 1 suppository(ies) rectally once a day (at bedtime)   Coumadin 2 mg oral tablet: 1 tab(s) orally once a day (alternates 0.5 and 1 tablet daily)   Rowasa 4 g/60 mL rectal kit: 1 milliliter(s) rectally 2 times a day, As Needed -for constipation   sertraline 100 mg oral tablet: 1 tab(s) orally once a day

## 2020-05-29 NOTE — DISCHARGE NOTE PROVIDER - CARE PROVIDER_API CALL
Latoya Peres  INTERNAL MEDICINE  [address] [place] [address]  Phone: [phone]  Fax: [phone]  Established Patient  Follow Up Time: 2 weeks    Jay Scott  [id number]  [address] [place] [address]  Phone: [phone]  Fax: [phone]  Follow Up Time: 1-3 days    Javad Baires  SURGERY  [address] [place] [address]  Phone: [phone]  Fax: [phone]  Established Patient  Follow Up Time: 1 week

## 2020-05-29 NOTE — PROGRESS NOTE ADULT - SUBJECTIVE AND OBJECTIVE BOX
[place] [facility] FOLLOW UP NOTE  --------------------------------------------------------------------------------  24 hour events/subjective: Patient examined. Appears comfortable.    PAST HISTORY  --------------------------------------------------------------------------------  No significant changes to PMH, PSH, FHx, SHx, unless otherwise noted    ALLERGIES & MEDICATIONS  --------------------------------------------------------------------------------  Allergies    No Known Allergies    Intolerances      Standing Inpatient Medications  collagenase Ointment 1 Application(s) Topical two times a day  Dakins Solution - 1/2 Strength 1 Application(s) Topical two times a day  heparin   Injectable 5000 Unit(s) SubCutaneous every 8 hours  multivitamin 1 Tablet(s) Oral daily  sertraline 100 milliGRAM(s) Oral daily    PRN Inpatient Medications      VITALS/PHYSICAL EXAM  --------------------------------------------------------------------------------  T(C): 35.4 (05-29-20 @ 14:09), Max: 36.8 (05-28-20 @ 23:46)  HR: 73 (05-29-20 @ 14:09) (64 - 82)  BP: 155/74 (05-29-20 @ 14:09) (101/54 - 155/75)  RR: 20 (05-29-20 @ 14:09) (16 - 20)  SpO2: 98% (05-29-20 @ 12:45) (98% - 100%)  Wt(kg): --        Physical Exam:  	Gen: NAD  	Pulm: CTA B/L  	CV: RRR, S1S2  	Abd: +BS, soft, nontender/nondistended  	: No suprapubic tenderness  	LE: Warm, no edema    LABS/STUDIES  --------------------------------------------------------------------------------              10.6   7.28  >-----------<  401      [05-29-20 @ 06:03]              32.3     141  |  101  |  42  ----------------------------<  118      [05-29-20 @ 06:03]  4.3   |  22  |  2.7        Ca     9.2     [05-29-20 @ 06:03]    TPro  7.1  /  Alb  3.4  /  TBili  0.3  /  DBili  x   /  AST  17  /  ALT  13  /  AlkPhos  67  [05-29-20 @ 06:03]    PT/INR: PT 18.80, INR 1.63       [05-29-20 @ 06:03]      Creatinine Trend:  SCr 2.7 [05-29 @ 06:03]  SCr 3.4 [05-27 @ 06:09]  SCr 3.8 [05-26 @ 12:30]  SCr 3.6 [05-22 @ 15:47]    Urinalysis - [05-22-20 @ 19:47]      Color Yellow / Appearance Clear / SG 1.018 / pH 6.0      Gluc Negative / Ketone Negative  / Bili Negative / Urobili <2 mg/dL       Blood Small / Protein 300 mg/dL / Leuk Est Negative / Nitrite Negative      RBC 1 / WBC 3 / Hyaline 7 / Gran  / Sq Epi  / Non Sq Epi 2 / Bacteria Negative

## 2020-05-29 NOTE — CHART NOTE - NSCHARTNOTEFT_GEN_A_CORE
Initially evaluated for Aortic dissection seen on MRI  Asymptomatic  BP controlled  Pt has no contraindications for endoscopy    SPECTRA [id number]

## 2020-05-29 NOTE — DISCHARGE NOTE PROVIDER - PROVIDER TOKENS
PROVIDER:[TOKEN:[[id number]],FOLLOWUP:[2 weeks],ESTABLISHEDPATIENT:[T]],PROVIDER:[TOKEN:[[id number]],FOLLOWUP:[1-3 days]],PROVIDER:[TOKEN:[[id number]],FOLLOWUP:[1 week],ESTABLISHEDPATIENT:[T]]

## 2020-06-03 LAB — SURGICAL PATHOLOGY STUDY: SIGNIFICANT CHANGE UP

## 2020-06-05 PROBLEM — N18.9 CHRONIC KIDNEY DISEASE, UNSPECIFIED: Chronic | Status: ACTIVE | Noted: 2020-05-26

## 2020-06-05 PROBLEM — D49.0 NEOPLASM OF UNSPECIFIED BEHAVIOR OF DIGESTIVE SYSTEM: Chronic | Status: ACTIVE | Noted: 2020-05-26

## 2020-06-08 DIAGNOSIS — Z90.49 ACQUIRED ABSENCE OF OTHER SPECIFIED PARTS OF DIGESTIVE TRACT: ICD-10-CM

## 2020-06-08 DIAGNOSIS — I96 GANGRENE, NOT ELSEWHERE CLASSIFIED: ICD-10-CM

## 2020-06-08 DIAGNOSIS — Q61.02 CONGENITAL MULTIPLE RENAL CYSTS: ICD-10-CM

## 2020-06-08 DIAGNOSIS — Z79.84 LONG TERM (CURRENT) USE OF ORAL HYPOGLYCEMIC DRUGS: ICD-10-CM

## 2020-06-08 DIAGNOSIS — L89.313 PRESSURE ULCER OF RIGHT BUTTOCK, STAGE 3: ICD-10-CM

## 2020-06-08 DIAGNOSIS — E78.5 HYPERLIPIDEMIA, UNSPECIFIED: ICD-10-CM

## 2020-06-08 DIAGNOSIS — Z79.01 LONG TERM (CURRENT) USE OF ANTICOAGULANTS: ICD-10-CM

## 2020-06-08 DIAGNOSIS — F41.9 ANXIETY DISORDER, UNSPECIFIED: ICD-10-CM

## 2020-06-08 DIAGNOSIS — R79.1 ABNORMAL COAGULATION PROFILE: ICD-10-CM
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
## 2020-06-08 DIAGNOSIS — E11.22 TYPE 2 DIABETES MELLITUS WITH DIABETIC CHRONIC KIDNEY DISEASE: ICD-10-CM
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
## 2020-06-08 DIAGNOSIS — I12.9 HYPERTENSIVE CHRONIC KIDNEY DISEASE WITH STAGE 1 THROUGH STAGE 4 CHRONIC KIDNEY DISEASE, OR UNSPECIFIED CHRONIC KIDNEY DISEASE: ICD-10-CM

## 2020-06-08 DIAGNOSIS — I71.2 THORACIC AORTIC ANEURYSM, WITHOUT RUPTURE: ICD-10-CM

## 2020-06-08 DIAGNOSIS — K50.111 CROHN'S DISEASE OF LARGE INTESTINE WITH RECTAL BLEEDING: ICD-10-CM

## 2020-06-08 DIAGNOSIS — K62.5 HEMORRHAGE OF ANUS AND RECTUM: ICD-10-CM

## 2020-06-08 DIAGNOSIS — K62.4 STENOSIS OF ANUS AND RECTUM: ICD-10-CM

## 2020-06-08 DIAGNOSIS — I95.9 HYPOTENSION, UNSPECIFIED: ICD-10-CM

## 2020-06-08 DIAGNOSIS — N20.0 CALCULUS OF KIDNEY: ICD-10-CM

## 2020-06-08 DIAGNOSIS — I71.01 DISSECTION OF THORACIC AORTA: ICD-10-CM

## 2020-06-08 DIAGNOSIS — N18.4 CHRONIC KIDNEY DISEASE, STAGE 4 (SEVERE): ICD-10-CM

## 2020-06-08 DIAGNOSIS — R63.8 OTHER SYMPTOMS AND SIGNS CONCERNING FOOD AND FLUID INTAKE: ICD-10-CM

## 2020-06-08 DIAGNOSIS — Z85.038 PERSONAL HISTORY OF OTHER MALIGNANT NEOPLASM OF LARGE INTESTINE: ICD-10-CM

## 2020-06-08 DIAGNOSIS — Z87.19 PERSONAL HISTORY OF OTHER DISEASES OF THE DIGESTIVE SYSTEM: ICD-10-CM

## 2020-06-08 DIAGNOSIS — K91.89 OTHER POSTPROCEDURAL COMPLICATIONS AND DISORDERS OF DIGESTIVE SYSTEM: ICD-10-CM

## 2020-06-08 DIAGNOSIS — L89.323 PRESSURE ULCER OF LEFT BUTTOCK, STAGE 3: ICD-10-CM

## 2020-06-08 DIAGNOSIS — Z86.718 PERSONAL HISTORY OF OTHER VENOUS THROMBOSIS AND EMBOLISM: ICD-10-CM

## 2020-06-08 DIAGNOSIS — N17.9 ACUTE KIDNEY FAILURE, UNSPECIFIED: ICD-10-CM

## 2020-06-09 ENCOUNTER — APPOINTMENT (OUTPATIENT)
Dept: BURN CARE | Facility: CLINIC | Age: 80
End: 2020-06-09
Payer: MEDICARE

## 2020-06-09 ENCOUNTER — OUTPATIENT (OUTPATIENT)
Dept: OUTPATIENT SERVICES | Facility: HOSPITAL | Age: 80
LOS: 1 days | Discharge: HOME | End: 2020-06-09

## 2020-06-09 PROCEDURE — 99213 OFFICE O/P EST LOW 20 MIN: CPT | Mod: 25

## 2020-06-09 PROCEDURE — 16025 DRESS/DEBRID P-THICK BURN M: CPT

## 2020-07-07 ENCOUNTER — OUTPATIENT (OUTPATIENT)
Dept: OUTPATIENT SERVICES | Facility: HOSPITAL | Age: 80
LOS: 1 days | Discharge: HOME | End: 2020-07-07

## 2020-07-07 ENCOUNTER — APPOINTMENT (OUTPATIENT)
Dept: BURN CARE | Facility: CLINIC | Age: 80
End: 2020-07-07
Payer: MEDICARE

## 2020-07-07 DIAGNOSIS — L89.309 PRESSURE ULCER OF UNSPECIFIED BUTTOCK, UNSPECIFIED STAGE: ICD-10-CM

## 2020-07-07 PROCEDURE — 16025 DRESS/DEBRID P-THICK BURN M: CPT

## 2020-07-07 PROCEDURE — 99213 OFFICE O/P EST LOW 20 MIN: CPT | Mod: 25

## 2020-08-04 ENCOUNTER — APPOINTMENT (OUTPATIENT)
Dept: BURN CARE | Facility: CLINIC | Age: 80
End: 2020-08-04

## 2021-11-16 ENCOUNTER — APPOINTMENT (OUTPATIENT)
Dept: BURN CARE | Facility: CLINIC | Age: 81
End: 2021-11-16
Payer: MEDICARE

## 2021-11-16 ENCOUNTER — OUTPATIENT (OUTPATIENT)
Dept: OUTPATIENT SERVICES | Facility: HOSPITAL | Age: 81
LOS: 1 days | Discharge: HOME | End: 2021-11-16

## 2021-11-16 PROCEDURE — 99213 OFFICE O/P EST LOW 20 MIN: CPT

## 2021-11-16 PROCEDURE — 16025 DRESS/DEBRID P-THICK BURN M: CPT

## 2021-11-22 DIAGNOSIS — L89.329 PRESSURE ULCER OF LEFT BUTTOCK, UNSPECIFIED STAGE: ICD-10-CM

## 2021-11-22 DIAGNOSIS — L89.309 PRESSURE ULCER OF UNSPECIFIED BUTTOCK, UNSPECIFIED STAGE: ICD-10-CM

## 2021-11-22 DIAGNOSIS — L89.319 PRESSURE ULCER OF RIGHT BUTTOCK, UNSPECIFIED STAGE: ICD-10-CM

## 2021-12-14 ENCOUNTER — APPOINTMENT (OUTPATIENT)
Dept: BURN CARE | Facility: CLINIC | Age: 81
End: 2021-12-14

## 2023-04-06 NOTE — CONSULT NOTE ADULT - ASSESSMENT
Call placed to pt's mom to offer an appt with any provider  LM    Javan Workman RN on 4/6/2023 at 8:21 AM   79 yo male with rectal bleeding on anticoagulation, with INR of  10.1  Rec  hold coumadin  Correct coagulopathy with Vit K  Tentative colonoscopy on Friday  Follow cbc, transfuse prn  clear liquid diet. 81 y/o Male with PMH of CKD, HTN, HLD, anal stenosis/strictures s/p surgery few yrs ago, Colon cancer? s/p colectomy in 1999 (in remission never had chemo or radiation) and DVT on Coumadin presented to the ED with complaint of bright red blood per rectum.       Hematochezia: DD includes anastomotic ulcer vs AVM vs Colorectal cancer vs small bowel bleed.  Hemodynamically stable  Hg stable  INR of 10 on coumadin    Rec:  clear liquid diet  Adequate fluid resuscitation (Keep SBP > 90)  Trend CBC every 8hrs and keep Hg > 8  will plan for Colonoscopy on Friday 5/29/2020  Please give the colonoscopy prep ( 1 whole Gallon of Golytely, dulcolax 20mg HS with clear liquids only) to achieve transparent watery stools on the procedure day.  Check electrolytes, INR and Hg (Hg >8, NA, K, Mg, INR levels should be normal) the day before the procedure.  Keep NPO after midnight for the day of procedure.  correct INR to the goal of < 1.5

## 2023-12-21 NOTE — ED PROVIDER NOTE - NSFOLLOWUPINSTRUCTIONS_ED_ALL_ED_FT
Addended by: GRETCHEN KEANE on: 12/21/2023 03:46 PM     Modules accepted: Orders     Follow up with PMD Dr. Maria Dolores Scott and also burn team in 4 days.  Wound Care, Adult  Taking care of your wound properly can help to prevent pain, infection, and scarring. It can also help your wound to heal more quickly.    How to care for your wound  Wound care     Image Image   Follow instructions from your health care provider about how to take care of your wound. Make sure you:  Wash your hands with soap and water before you change the bandage (dressing). If soap and water are not available, use hand .  Change your dressing as told by your health care provider.  Leave stitches (sutures), skin glue, or adhesive strips in place. These skin closures may need to stay in place for 2 weeks or longer. If adhesive strip edges start to loosen and curl up, you may trim the loose edges. Do not remove adhesive strips completely unless your health care provider tells you to do that.  Check your wound area every day for signs of infection. Check for:  Redness, swelling, or pain.  Fluid or blood.  Warmth.  Pus or a bad smell.  Ask your health care provider if you should clean the wound with mild soap and water. Doing this may include:  Using a clean towel to pat the wound dry after cleaning it. Do not rub or scrub the wound.  Applying a cream or ointment. Do this only as told by your health care provider.  Covering the incision with a clean dressing.  Ask your health care provider when you can leave the wound uncovered.  Keep the dressing dry until your health care provider says it can be removed. Do not take baths, swim, use a hot tub, or do anything that would put the wound underwater until your health care provider approves. Ask your health care provider if you can take showers. You may only be allowed to take sponge baths.  Medicines     Image   If you were prescribed an antibiotic medicine, cream, or ointment, take or use the antibiotic as told by your health care provider. Do not stop taking or using the antibiotic even if your condition improves.  Take over-the-counter and prescription medicines only as told by your health care provider. If you were prescribed pain medicine, take it 30 or more minutes before you do any wound care or as told by your health care provider.  General instructions     Return to your normal activities as told by your health care provider. Ask your health care provider what activities are safe.  Do not scratch or pick at the wound.  Do not use any products that contain nicotine or tobacco, such as cigarettes and e-cigarettes. These may delay wound healing. If you need help quitting, ask your health care provider.  Keep all follow-up visits as told by your health care provider. This is important.  Eat a diet that includes protein, vitamin A, vitamin C, and other nutrient-rich foods to help the wound heal.  Foods rich in protein include meat, dairy, beans, nuts, and other sources.  Foods rich in vitamin A include carrots and dark green, leafy vegetables.  Foods rich in vitamin C include citrus, tomatoes, and other fruits and vegetables.  Nutrient-rich foods have protein, carbohydrates, fat, vitamins, or minerals. Eat a variety of healthy foods including vegetables, fruits, and whole grains.  Contact a health care provider if:  You received a tetanus shot and you have swelling, severe pain, redness, or bleeding at the injection site.  Your pain is not controlled with medicine.  You have redness, swelling, or pain around the wound.  You have fluid or blood coming from the wound.  Your wound feels warm to the touch.  You have pus or a bad smell coming from the wound.  You have a fever or chills.  You are nauseous or you vomit.  You are dizzy.  Get help right away if:  You have a red streak going away from your wound.  The edges of the wound open up and separate.  Your wound is bleeding, and the bleeding does not stop with gentle pressure.  You have a rash.  You faint.  You have trouble breathing.  Summary  Always wash your hands with soap and water before changing your bandage (dressing).  To help with healing, eat foods that are rich in protein, vitamin A, vitamin C, and other nutrients.  Check your wound every day for signs of infection. Contact your health care provider if you suspect that your wound is infected.  This information is not intended to replace advice given to you by your health care provider. Make sure you discuss any questions you have with your health care provider.

## 2025-04-23 NOTE — H&P ADULT - HEMATOLOGY/LYMPHATICS
Kari Dimas  (1962)    4/23/2025    Subjective:     Kari Dimas is 62 y.o. female who complains today of:    Chief Complaint   Patient presents with    Follow-up     No change      Discuss Medications         Allergies:  Nsaids and Ibuprofen    Past Medical History:   Diagnosis Date    Anemia     Arthritis     Chicken pox     Chronic back pain     H/O bladder infections     Headache(784.0)     History of blood transfusion     post delivery of first child, again after gastric ulcer cauterization, again for anemia    Hyperlipidemia     meds for about 1 year    Hypertension     meds for about 2 years    Incomplete bladder emptying 09/29/2009    Measles     Mumps     Nausea, vomiting and diarrhea 08/17/2020    Stroke (HCC) 07/2020     Past Surgical History:   Procedure Laterality Date    APPENDECTOMY      BACK SURGERY  2014    lumbar back fusion    CHOLECYSTECTOMY      2017    COLONOSCOPY      ENDOSCOPY, COLON, DIAGNOSTIC      GALLBLADDER SURGERY      HYSTERECTOMY (CERVIX STATUS UNKNOWN)      1980    JOINT REPLACEMENT Right 2020    JOINT REPLACEMENT Left 2018    KNEE SURGERY Right     LEG BIOPSY EXCISION Right 11/12/2021    SOFT TISSUE MASS EXCISION RIGHT ANKLE performed by Carter Jung MD at [facility] OR    LUMBAR FUSION N/A 10/21/2022    RIGHT BILATERAL L2-3 LAMINECTOMIES MICRODISSECTION FORAMINOTOMIES INTERBODY POSTEROLATERAL PEDICLE SCREW FUSION WITH IGS performed by Delores Santillan MD at [facility] OR    ROTATOR CUFF REPAIR      TONSILLECTOMY       Family History   Problem Relation Age of Onset    Stroke Mother     Cancer Father     Diabetes Sister     High Blood Pressure Sister     High Blood Pressure Brother     Diabetes Brother     No Known Problems Daughter      Social History     Socioeconomic History    Marital status:      Spouse name: Not on file    Number of children: Not on file    Years of education: Not on file    Highest education level: Not on file   Occupational History    Not on file  not applicable